# Patient Record
Sex: FEMALE | Race: WHITE
[De-identification: names, ages, dates, MRNs, and addresses within clinical notes are randomized per-mention and may not be internally consistent; named-entity substitution may affect disease eponyms.]

---

## 2018-11-03 ENCOUNTER — HOSPITAL ENCOUNTER (INPATIENT)
Dept: HOSPITAL 61 - ER | Age: 43
LOS: 6 days | Discharge: HOME | DRG: 378 | End: 2018-11-09
Attending: INTERNAL MEDICINE | Admitting: INTERNAL MEDICINE
Payer: COMMERCIAL

## 2018-11-03 VITALS — SYSTOLIC BLOOD PRESSURE: 159 MMHG | DIASTOLIC BLOOD PRESSURE: 88 MMHG

## 2018-11-03 VITALS — BODY MASS INDEX: 36.1 KG/M2 | HEIGHT: 67 IN | WEIGHT: 230 LBS

## 2018-11-03 DIAGNOSIS — E11.65: ICD-10-CM

## 2018-11-03 DIAGNOSIS — T45.515A: ICD-10-CM

## 2018-11-03 DIAGNOSIS — Z83.3: ICD-10-CM

## 2018-11-03 DIAGNOSIS — D68.2: ICD-10-CM
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
## 2018-11-03 DIAGNOSIS — K92.2: Primary | ICD-10-CM

## 2018-11-03 DIAGNOSIS — K59.00: ICD-10-CM

## 2018-11-03 DIAGNOSIS — K50.90: ICD-10-CM

## 2018-11-03 DIAGNOSIS — Z98.51: ICD-10-CM

## 2018-11-03 DIAGNOSIS — D68.51: ICD-10-CM

## 2018-11-03 DIAGNOSIS — G43.909: ICD-10-CM

## 2018-11-03 DIAGNOSIS — Z88.0: ICD-10-CM

## 2018-11-03 DIAGNOSIS — Z86.711: ICD-10-CM

## 2018-11-03 DIAGNOSIS — Z86.718: ICD-10-CM

## 2018-11-03 DIAGNOSIS — Z88.8: ICD-10-CM

## 2018-11-03 DIAGNOSIS — Z79.01: ICD-10-CM
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
## 2018-11-03 DIAGNOSIS — K55.9: ICD-10-CM

## 2018-11-03 DIAGNOSIS — K76.0: ICD-10-CM

## 2018-11-03 DIAGNOSIS — C18.9: ICD-10-CM

## 2018-11-03 DIAGNOSIS — A09: ICD-10-CM

## 2018-11-03 DIAGNOSIS — I10: ICD-10-CM
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
## 2018-11-03 DIAGNOSIS — Z90.49: ICD-10-CM

## 2018-11-03 LAB
ALBUMIN SERPL-MCNC: 3.3 G/DL (ref 3.4–5)
ALBUMIN/GLOB SERPL: 0.8 {RATIO} (ref 1–1.7)
ALP SERPL-CCNC: 81 U/L (ref 46–116)
ALT SERPL-CCNC: 63 U/L (ref 14–59)
ANION GAP SERPL CALC-SCNC: 11 MMOL/L (ref 6–14)
APTT PPP: YELLOW S
AST SERPL-CCNC: 28 U/L (ref 15–37)
BACTERIA #/AREA URNS HPF: (no result) /HPF
BASOPHILS # BLD AUTO: 0.1 X10^3/UL (ref 0–0.2)
BASOPHILS NFR BLD: 1 % (ref 0–3)
BILIRUB SERPL-MCNC: 0.2 MG/DL (ref 0.2–1)
BILIRUB UR QL STRIP: NEGATIVE
BUN SERPL-MCNC: 8 MG/DL (ref 7–20)
BUN/CREAT SERPL: 10 (ref 6–20)
CALCIUM SERPL-MCNC: 9.4 MG/DL (ref 8.5–10.1)
CHLORIDE SERPL-SCNC: 99 MMOL/L (ref 98–107)
CO2 SERPL-SCNC: 25 MMOL/L (ref 21–32)
CREAT SERPL-MCNC: 0.8 MG/DL (ref 0.6–1)
EOSINOPHIL NFR BLD: 0.1 X10^3/UL (ref 0–0.7)
EOSINOPHIL NFR BLD: 1 % (ref 0–3)
ERYTHROCYTE [DISTWIDTH] IN BLOOD BY AUTOMATED COUNT: 13.3 % (ref 11.5–14.5)
FECAL OB PT: POSITIVE
FIBRINOGEN PPP-MCNC: CLEAR MG/DL
GFR SERPLBLD BASED ON 1.73 SQ M-ARVRAT: 78.7 ML/MIN
GLOBULIN SER-MCNC: 4.4 G/DL (ref 2.2–3.8)
GLUCOSE SERPL-MCNC: 426 MG/DL (ref 70–99)
HCT VFR BLD CALC: 40.2 % (ref 36–47)
HGB BLD-MCNC: 14.5 G/DL (ref 12–15.5)
LIPASE: 183 U/L (ref 73–393)
LYMPHOCYTES # BLD: 1.7 X10^3/UL (ref 1–4.8)
LYMPHOCYTES NFR BLD AUTO: 31 % (ref 24–48)
MCH RBC QN AUTO: 30 PG (ref 25–35)
MCHC RBC AUTO-ENTMCNC: 36 G/DL (ref 31–37)
MCV RBC AUTO: 84 FL (ref 79–100)
MONO #: 0.4 X10^3/UL (ref 0–1.1)
MONOCYTES NFR BLD: 7 % (ref 0–9)
NEUT #: 3.3 X10^3UL (ref 1.8–7.7)
NEUTROPHILS NFR BLD AUTO: 60 % (ref 31–73)
NITRITE UR QL STRIP: NEGATIVE
PH UR STRIP: 5 [PH]
PLATELET # BLD AUTO: 219 X10^3/UL (ref 140–400)
POTASSIUM SERPL-SCNC: 4 MMOL/L (ref 3.5–5.1)
PROT SERPL-MCNC: 7.7 G/DL (ref 6.4–8.2)
PROT UR STRIP-MCNC: NEGATIVE MG/DL
PROTHROMBIN TIME: 48.9 SEC (ref 11.7–14)
RBC # BLD AUTO: 4.78 X10^6/UL (ref 3.5–5.4)
RBC #/AREA URNS HPF: 0 /HPF (ref 0–2)
SODIUM SERPL-SCNC: 135 MMOL/L (ref 136–145)
SQUAMOUS #/AREA URNS LPF: (no result) /LPF
UROBILINOGEN UR-MCNC: 0.2 MG/DL
WBC # BLD AUTO: 5.5 X10^3/UL (ref 4–11)
WBC #/AREA URNS HPF: 0 /HPF (ref 0–4)

## 2018-11-03 PROCEDURE — 84443 ASSAY THYROID STIM HORMONE: CPT

## 2018-11-03 PROCEDURE — 96375 TX/PRO/DX INJ NEW DRUG ADDON: CPT

## 2018-11-03 PROCEDURE — 86900 BLOOD TYPING SEROLOGIC ABO: CPT

## 2018-11-03 PROCEDURE — 90471 IMMUNIZATION ADMIN: CPT

## 2018-11-03 PROCEDURE — 86927 PLASMA FRESH FROZEN: CPT

## 2018-11-03 PROCEDURE — 96365 THER/PROPH/DIAG IV INF INIT: CPT

## 2018-11-03 PROCEDURE — 85025 COMPLETE CBC W/AUTO DIFF WBC: CPT

## 2018-11-03 PROCEDURE — 81025 URINE PREGNANCY TEST: CPT

## 2018-11-03 PROCEDURE — P9017 PLASMA 1 DONOR FRZ W/IN 8 HR: HCPCS

## 2018-11-03 PROCEDURE — 81001 URINALYSIS AUTO W/SCOPE: CPT

## 2018-11-03 PROCEDURE — 82274 ASSAY TEST FOR BLOOD FECAL: CPT

## 2018-11-03 PROCEDURE — 85610 PROTHROMBIN TIME: CPT

## 2018-11-03 PROCEDURE — 86901 BLOOD TYPING SEROLOGIC RH(D): CPT

## 2018-11-03 PROCEDURE — 70450 CT HEAD/BRAIN W/O DYE: CPT

## 2018-11-03 PROCEDURE — 74177 CT ABD & PELVIS W/CONTRAST: CPT

## 2018-11-03 PROCEDURE — 83690 ASSAY OF LIPASE: CPT

## 2018-11-03 PROCEDURE — 90756 CCIIV4 VACC ABX FREE IM: CPT

## 2018-11-03 PROCEDURE — 80048 BASIC METABOLIC PNL TOTAL CA: CPT

## 2018-11-03 PROCEDURE — 36415 COLL VENOUS BLD VENIPUNCTURE: CPT

## 2018-11-03 PROCEDURE — 30233L1 TRANSFUSION OF NONAUTOLOGOUS FRESH PLASMA INTO PERIPHERAL VEIN, PERCUTANEOUS APPROACH: ICD-10-PCS | Performed by: INTERNAL MEDICINE

## 2018-11-03 PROCEDURE — 80053 COMPREHEN METABOLIC PANEL: CPT

## 2018-11-03 PROCEDURE — 86850 RBC ANTIBODY SCREEN: CPT

## 2018-11-03 PROCEDURE — 30233K1 TRANSFUSION OF NONAUTOLOGOUS FROZEN PLASMA INTO PERIPHERAL VEIN, PERCUTANEOUS APPROACH: ICD-10-PCS | Performed by: INTERNAL MEDICINE

## 2018-11-03 PROCEDURE — 82962 GLUCOSE BLOOD TEST: CPT

## 2018-11-03 RX ADMIN — MORPHINE SULFATE PRN MG: 4 INJECTION, SOLUTION INTRAMUSCULAR; INTRAVENOUS at 23:04

## 2018-11-03 NOTE — RAD
CT HEAD WO CONTRAST

 

Clinical indications: PT FELL DOWN STAIRS; EVAL FOR BLEED 

 

COMPARISON: None available.

 

Technique: Noncontrast axial cross sectional scanning of the head was 

performed. 

 

PQRS compliance Statement

 

One or more of the following individualized dose reduction techniques were

utilized for this study:

1.  Automated exposure control

2.  Adjustment of the mA and/or kV according to patient size

3.  Use of iterative reconstruction technique

 

 

 

Findings: No acute intracranial hemorrhage or midline shift or mass-effect

or hydrocephalus or extra-axial fluid collection is seen. No focal 

hypodense area or sulci effacement is seen to indicate an acute infarct or

edema radiographically.  No skull fracture or pneumocephalus is seen. No 

opacification of the mastoid sinuses or the paranasal sinuses is seen. The

maxillary sinuses are not completely seen in this study.

 

Impression:  No acute intracranial abnormality is seen.

 

Electronically signed by: German Akhtar MD (11/3/2018 9:09 PM) [facility]

## 2018-11-03 NOTE — RAD
CT study of the abdomen and pelvis with contrast

 

Clinical indications: Patient fell down stairs. Abdominal pain. Bloody 

stools. On Coumadin.

 

TECHNIQUE: After IV infusion of 75 cc of Omnipaque 300, helical CT 

scanning of abdomen and pelvis was performed. No GI contrast was 

administered. This may decrease the sensitivity to detect GI tract 

pathology.

 

 

 

PQRS compliance Statement

 

One or more of the following individualized dose reduction techniques were

utilized for this study:

1.  Automated exposure control

2.  Adjustment of the mA and/or kV according to patient size

3.  Use of iterative reconstruction technique

 

COMPARISON: None available.

 

FINDINGS: The liver and spleen and pancreas are normal. The gallbladder is

surgically absent. No extra hepatic biliary ductal dilatation is seen. No 

adrenal mass is evident. Both kidneys are normal without hydronephrosis or

hydroureter. Urinary bladder wall is smooth. No uterine mass is seen. No 

dominant ovarian cyst or mass is evident. No focal aneurysmal dilatation 

of the abdominal aorta is seen. No enlarged abdominal or pelvic 

lymphadenopathy is evident. No obstructive bowel pattern is evident. The 

terminal ileum is unremarkable. The appendix appears normal. There is 

segmental wall thickening of the descending colon and sigmoid colon. This 

may be due to incomplete distention but could be seen with colitis. No 

free intraperitoneal air or free fluid or mesenteric edema is seen. No 

lung base consolidation is evident. No osteolytic process is evident. No 

anterior abdominal wall musculature hematoma is evident. No 

retroperitoneal hematoma is evident.

 

IMPRESSION: Segmental wall thickening of the descending colon and sigmoid 

colon. This may be due to incomplete distention but could be seen with 

colitis. No pneumatosis intestinalis or free air or free fluid or 

mesenteric edema is seen.

 

The heart size mildly enlarged.

 

Electronically signed by: German Akhtar MD (11/3/2018 9:49 PM) [facility]

## 2018-11-03 NOTE — PHYS DOC
Past Medical History


Past Medical History:  Diabetes-Type II, DVT, Hypertension


Additional Past Medical Histor:  PE/DVT, HEART MURMUR,


Past Surgical History:  Cholecystectomy, Tubal ligation


Additional Past Surgical Histo:  OVARIAN CYST,BILAT EARS,LEAP, LYMP NODES


Alcohol Use:  None


Drug Use:  None





Adult General


Chief Complaint


Chief Complaint:  ABDOMINAL PAIN





HPI


HPI





Patient is a 43  year old female history of factor V Leiden deficiency present 

with abdominal pain. Onset 2 days ago she fell down 10 steps she went to 

[facility] her INR was 10.5 she is on Coumadin they give her 

vitamin K came down to 7.5 she says she had a CT head and CT C-spine that was 

negative acute and they observed her overnight and then sent her home but her 

abdominal pain is described as cramping diffuse bilateral lower quadrant 

radiates to the back also some pain with moving her left hip she said they did 

not do a CT scan on that area despite she did tell them she was having 

abdominal pain. The pain is getting worse with time so she came to the ER for 

evaluation.





Review of Systems


Review of Systems





Constitutional: Denies fever or chills []


Eyes: Denies change in visual acuity, redness, or eye pain []


HENT: Denies nasal congestion or sore throat []


Respiratory: Denies cough or shortness of breath []


Cardiovascular: No additional information not addressed in HPI []





: Denies dysuria or hematuria []


Musculoskeletal


Integument: Denies rash or skin lesions []





All other systems were reviewed and found to be within normal limits, except as 

documented in this note.





Current Medications


Current Medications





Current Medications








 Medications


  (Trade)  Dose


 Ordered  Sig/Lila  Start Time


 Stop Time Status Last Admin


Dose Admin


 


 Fentanyl Citrate


  (Fentanyl 2ml


 Vial)  50 mcg  1X  ONCE  11/3/18 20:15


 11/3/18 20:16 DC 11/3/18 20:24


50 MCG


 


 Iohexol


  (Omnipaque 300


 Mg/ml)  100 ml  STK-MED ONCE  11/3/18 21:08


 11/3/18 21:09 DC  





 


 Metronidazole  100 ml @ 


 100 mls/hr  1X  ONCE  11/3/18 22:30


 11/3/18 23:29  11/3/18 23:04


100 MLS/HR


 


 Morphine Sulfate


  (Morphine


 Sulfate)  4 mg  PRN Q2HR  PRN  11/3/18 22:45


 11/4/18 22:44  11/3/18 23:04


4 MG


 


 Phytonadione


  (Mephyton Oral


 Soln)  2.5 mg  1X  ONCE  11/3/18 22:45


 11/3/18 22:46 DC 11/3/18 23:05


2.5 MG


 


 Sodium Chloride  1,000 ml @ 


 100 mls/hr  Q10H  11/3/18 22:45


 11/4/18 22:44   














Allergies


Allergies





Allergies








Coded Allergies Type Severity Reaction Last Updated Verified


 


  acetaminophen Allergy Severe THROAT SWELLS 3/2/16 Yes


 


  propoxyphene Allergy Severe THROAT SWELLS 3/2/16 Yes


 


  Penicillins Allergy Intermediate HIVES 3/2/16 Yes











Physical Exam


Physical Exam





Constitutional: Well developed, well nourished, no acute distress, non-toxic 

appearance. []


HENT: Normocephalic, atraumatic, bilateral external ears normal, oropharynx 

moist, no oral exudates, nose normal. []


Eyes: PERRLA, EOMI, conjunctiva normal, no discharge. [] 


Neck: Normal range of motion, left paraspinous tenderness, supple, no stridor. [

] 


Cardiovascular:Heart rate regular rhythm, no murmur []


Lungs & Thorax:  Bilateral breath sounds clear to auscultation []


Abdomen: Bowel sounds normal, soft,rlq and llq  tenderness, no masses, no 

pulsatile masses. [] 


rectal performed by rn staff showed brown stool.


Skin: Warm, dry, no erythema, no rash. [] 


Back: pos b/l paraspinous ttp noted, no trauma seen. obese noted.


Extremities: No tenderness, no cyanosis, no clubbing, ROM intact, no edema. []  

mild pain with rom of the left hip but still intact.


Neurologic: Alert and oriented X 3, normal motor function, normal sensory 

function, no focal deficits noted. []


Psychologic: Affect normal, judgement normal, mood normal. []





Current Patient Data


Vital Signs





 Vital Signs








  Date Time  Temp Pulse Resp B/P (MAP) Pulse Ox O2 Delivery O2 Flow Rate FiO2


 


11/3/18 20:07 98.2 92 20 167/114 (131) 97 Room Air  





 98.2       








Lab Values





 Laboratory Tests








Test


 11/3/18


19:57 11/3/18


20:08 11/3/18


20:18 11/3/18


21:08


 


Urine Collection Type Unknown     


 


Urine Color Yellow     


 


Urine Clarity Clear     


 


Urine pH 5.0     


 


Urine Specific Gravity >=1.030     


 


Urine Protein


 Negative mg/dL


(NEG-TRACE) 


 


 





 


Urine Glucose (UA)


 >=1000 mg/dL


(NEG) 


 


 





 


Urine Ketones (Stick)


 Negative mg/dL


(NEG) 


 


 





 


Urine Blood


 Negative (NEG)


 


 


 





 


Urine Nitrite


 Negative (NEG)


 


 


 





 


Urine Bilirubin


 Negative (NEG)


 


 


 





 


Urine Urobilinogen Dipstick


 0.2 mg/dL (0.2


mg/dL) 


 


 





 


Urine Leukocyte Esterase


 Negative (NEG)


 


 


 





 


Urine RBC 0 /HPF (0-2)     


 


Urine WBC 0 /HPF (0-4)     


 


Urine Squamous Epithelial


Cells Mod /LPF  


 


 


 





 


Urine Bacteria


 Few /HPF


(0-FEW) 


 


 





 


POC Urine HCG, Qualitative


 


 Hcg negative


(Negative) 


 





 


White Blood Count


 


 


 5.5 x10^3/uL


(4.0-11.0) 





 


Red Blood Count


 


 


 4.78 x10^6/uL


(3.50-5.40) 





 


Hemoglobin


 


 


 14.5 g/dL


(12.0-15.5) 





 


Hematocrit


 


 


 40.2 %


(36.0-47.0) 





 


Mean Corpuscular Volume


 


 


 84 fL ()


 





 


Mean Corpuscular Hemoglobin   30 pg (25-35)   


 


Mean Corpuscular Hemoglobin


Concent 


 


 36 g/dL


(31-37) 





 


Red Cell Distribution Width


 


 


 13.3 %


(11.5-14.5) 





 


Platelet Count


 


 


 219 x10^3/uL


(140-400) 





 


Neutrophils (%) (Auto)   60 % (31-73)   


 


Lymphocytes (%) (Auto)   31 % (24-48)   


 


Monocytes (%) (Auto)   7 % (0-9)   


 


Eosinophils (%) (Auto)   1 % (0-3)   


 


Basophils (%) (Auto)   1 % (0-3)   


 


Neutrophils # (Auto)


 


 


 3.3 x10^3uL


(1.8-7.7) 





 


Lymphocytes # (Auto)


 


 


 1.7 x10^3/uL


(1.0-4.8) 





 


Monocytes # (Auto)


 


 


 0.4 x10^3/uL


(0.0-1.1) 





 


Eosinophils # (Auto)


 


 


 0.1 x10^3/uL


(0.0-0.7) 





 


Basophils # (Auto)


 


 


 0.1 x10^3/uL


(0.0-0.2) 





 


Prothrombin Time


 


 


 48.9 SEC


(11.7-14.0)  H 





 


Prothrombin Time INR


 


 


 5.5 (0.8-1.1)


*H 





 


Sodium Level


 


 


 135 mmol/L


(136-145)  L 





 


Potassium Level


 


 


 4.0 mmol/L


(3.5-5.1) 





 


Chloride Level


 


 


 99 mmol/L


() 





 


Carbon Dioxide Level


 


 


 25 mmol/L


(21-32) 





 


Anion Gap   11 (6-14)   


 


Blood Urea Nitrogen


 


 


 8 mg/dL (7-20)


 





 


Creatinine


 


 


 0.8 mg/dL


(0.6-1.0) 





 


Estimated GFR


(Cockcroft-Gault) 


 


 78.7  


 





 


BUN/Creatinine Ratio   10 (6-20)   


 


Glucose Level


 


 


 426 mg/dL


(70-99)  H 





 


Calcium Level


 


 


 9.4 mg/dL


(8.5-10.1) 





 


Total Bilirubin


 


 


 0.2 mg/dL


(0.2-1.0) 





 


Aspartate Amino Transferase


(AST) 


 


 28 U/L (15-37)


 





 


Alanine Aminotransferase (ALT)


 


 


 63 U/L (14-59)


H 





 


Alkaline Phosphatase


 


 


 81 U/L


() 





 


Total Protein


 


 


 7.7 g/dL


(6.4-8.2) 





 


Albumin


 


 


 3.3 g/dL


(3.4-5.0)  L 





 


Albumin/Globulin Ratio


 


 


 0.8 (1.0-1.7)


L 





 


Lipase


 


 


 183 U/L


() 





 


Stool Occult Blood


 


 


 


 Positive (NEG)








 Laboratory Tests


11/3/18 20:18








 Laboratory Tests


11/3/18 20:18














EKG


EKG


[]





Radiology/Procedures


Radiology/Procedures


[]


Impressions:


FINDINGS: The liver and spleen and pancreas are normal. The gallbladder is


surgically absent. No extra hepatic biliary ductal dilatation is seen. No 


adrenal mass is evident. Both kidneys are normal without hydronephrosis or


hydroureter. Urinary bladder wall is smooth. No uterine mass is seen. No 


dominant ovarian cyst or mass is evident. No focal aneurysmal dilatation 


of the abdominal aorta is seen. No enlarged abdominal or pelvic 


lymphadenopathy is evident. No obstructive bowel pattern is evident. The 


terminal ileum is unremarkable. The appendix appears normal. There is 


segmental wall thickening of the descending colon and sigmoid colon. This 


may be due to incomplete distention but could be seen with colitis. No 


free intraperitoneal air or free fluid or mesenteric edema is seen. No 


lung base consolidation is evident. No osteolytic process is evident. No 


anterior abdominal wall musculature hematoma is evident. No 


retroperitoneal hematoma is evident.


 


IMPRESSION: Segmental wall thickening of the descending colon and sigmoid 


colon. This may be due to incomplete distention but could be seen with 


colitis. No pneumatosis intestinalis or free air or free fluid or 


mesenteric edema is seen.


 


The heart size mildly enlarged.


 


Electronically signed by: German Akhtar MD (11/3/2018 9:49 PM) [facility]











3.  Use of iterative reconstruction technique


 


 


 


Findings: No acute intracranial hemorrhage or midline shift or mass-effect


or hydrocephalus or extra-axial fluid collection is seen. No focal 


hypodense area or sulci effacement is seen to indicate an acute infarct or


edema radiographically.  No skull fracture or pneumocephalus is seen. No 


opacification of the mastoid sinuses or the paranasal sinuses is seen. The


maxillary sinuses are not completely seen in this study.


 


Impression:  No acute intracranial abnormality is seen.


 


Electronically signed by: German Akhtar MD (11/3/2018 9:09 PM) [facility] [place]

















Course & Med Decision Making


Course & Med Decision Making


Pertinent Labs and Imaging studies reviewed. (See chart for details)





[]43-year-old female with history of factor V Leiden who is minor 5.5 

apparently was 10.52 days ago she did fall down the stairs. She has been having 

abdominal pain since then but she also is having some bright red blood per 

rectum mixed in with brown stool she tells me. CT scan shows colitis most 

likely. Patient is hemodynamically stable hemoglobin is normal patient received 

antibiotics as well as a small dose of vitamin K in the emergency room because 

her INR is still 5.5 with her rectal bleeding even though mild I don't want to 

get worse. I think the benefit of a small amount of vitamin K especially in 

light of starting antibiotics which may affect her INR is beneficial.





We'll admit to the service of Dr. turner discussed case with him at 10:30 PM





Dragon Disclaimer


Tatianna Disclaimer


This electronic medical record was generated, in whole or in part, using a 

voice recognition dictation system.





Departure


Departure


Impression:  


 Primary Impression:  


 Colitis


 Additional Impression:  


 Coumadin toxicity


Disposition:  09 ADMITTED AS INPATIENT


Admitting Physician:  Other


Condition:  STABLE


Referrals:  


UNKNOWN PCP NAME (PCP)





Problem Qualifiers











KATIE GAMEZ MD Nov 3, 2018 20:23

## 2018-11-03 NOTE — PDOC1
History and Physical


Date of Admission


Date of Admission


DATE: 11/3/18 


TIME: 23:38





Identification/Chief Complaint


Chief Complaint


Abdominal pain


Rectal bleeding





Source


Source:  Patient





History of Present Illness


History of Present Illness


44 yo F w/ PMHx DM, migraines, factor V Leiden deficiency (multiple PE, DVT on 

lifelong anticoagulation with coumadin) p/w abdominal pain. Onset 2 days ago, 

associated with blood in stool


Pain is cramping, colicky diffuse bilateral upper quadrant radiates to the back 

also some pain with moving her left hip. 





Incidentally she fell down steps 2 days ago, went to [facility] 

her INR was 10.5 --> 7.5 with vitamin K, she says she had a CT head and CT C-

spine that was negative acute and they observed her overnight. No CT scan 

abdomen despite the lower GI bleeding and pain being her secondary complaint.





In ED CT showed possible inflammation, no leukocytosis, but ALT 63. Glucose was 

426, lipase 183. Her INR was 5.5 and had positive occult blood in stool.





Past Medical History


Cardiovascular:  No pertinent hx


Pulmonary:  No pertinent hx


CENTRAL NERVOUS SYSTEM:  Migraine


GI:  No pertinent hx


Heme/Onc:  Other (FV Leiden deficiency)


Hepatobiliary:  No pertinent hx


Psych:  No pertinent hx


Rheumatologic:  No pertinent hx


Infectious disease:  No pertinent hx


ENT:  No pertinent hx


Renal/:  No pertinent hx


Endocrine:  Diabetes


Dermatology:  No pertinent hx





Past Surgical History


Past Surgical History:  Cholecystectomy, Tubal Ligation





Family History


Family History:  Diabetes, High Cholestrol, Migranes


Family History:  Grandparents (FV leiden)





Social History


Smoke:  No


ALCOHOL:  rare


Drugs:  None





Current Problem List


Problem List


Problems


Medical Problems:


(1) Coumadin toxicity


Status: Acute  











Current Medications


Current Medications





Current Medications


Fentanyl Citrate (Fentanyl 2ml Vial) 50 mcg 1X  ONCE IV  Last administered on 11

/3/18at 20:24;  Start 11/3/18 at 20:15;  Stop 11/3/18 at 20:16;  Status DC


Iohexol (Omnipaque 300 Mg/ml) 100 ml STK-MED ONCE .ROUTE ;  Start 11/3/18 at 21:

08;  Stop 11/3/18 at 21:09;  Status DC


Metronidazole 100 ml @  100 mls/hr 1X  ONCE IV  Last administered on 11/3/18at 

23:04;  Start 11/3/18 at 22:30;  Stop 11/3/18 at 23:29;  Status DC


Aztreonam 1 gm/ Dextrose 50 ml @  100 mls/hr Q6HRS IV ;  Start 11/4/18 at 06:00


Sodium Chloride 1,000 ml @  1,000 mls/hr 1X  ONCE IV  Last administered on 11/3/

18at 23:04;  Start 11/3/18 at 22:30;  Stop 11/3/18 at 23:29;  Status DC


Phytonadione (Mephyton Oral Soln) 2.5 mg 1X  ONCE PO  Last administered on 11/3/

18at 23:05;  Start 11/3/18 at 22:45;  Stop 11/3/18 at 22:46;  Status DC


Morphine Sulfate (Morphine Sulfate) 4 mg PRN Q2HR  PRN IV PAIN Last 

administered on 11/3/18at 23:04;  Start 11/3/18 at 22:45;  Stop 11/4/18 at 22:44


Sodium Chloride 1,000 ml @  100 mls/hr Q10H IV ;  Start 11/3/18 at 22:45;  Stop 

11/4/18 at 22:44


Aztreonam 1 gm/ Dextrose 50 ml @  100 mls/hr ONCE  ONCE IV ;  Start 11/3/18 at 

23:00;  Stop 11/3/18 at 23:29;  Status DC





Active Scripts


Active


Reported


Atacand (Candesartan Cilexetil) 32 Mg Tablet 1 Tab PO DAILY


Quinapril-Hctz 10-12.5 Mg Tab (Quinapril/Hydrochlorothiazide) 1 Each Tablet 1 

Each PO DAILY


Relpax (Eletriptan Hbr) 20 Mg Tablet 20 Mg PO 


Reglan (Metoclopramide Hcl) 10 Mg Tablet 10 Mg PO [id number]


Lyrica (Pregabalin) 50 Mg Capsule 50 Mg PO BID


Clonazepam 0.5 Mg Tablet 1 Tab PO DAILY


Zoloft (Sertraline Hcl) 50 Mg Tablet 1 Tab PO DAILY


Crestor (Rosuvastatin Calcium) 10 Mg Tablet 1 Tab PO DAILY


Lantus Solostar (Insulin Glargine,Hum.rec.anlog) 100 Unit/1 Ml Insuln.pen 10 

Unit SQ QHS





Allergies


Allergies:  


Coded Allergies:  


     acetaminophen (Verified  Allergy, Severe, THROAT SWELLS, 3/2/16)


     propoxyphene (Verified  Allergy, Severe, THROAT SWELLS, 3/2/16)


     Penicillins (Verified  Allergy, Intermediate, HIVES, 3/2/16)


     vancomycin (Verified  Allergy, Unknown, 11/4/18)





ROS


General:  No: Chills, Night Sweats, Fatigue, Malaise, Appetite, Other


PSYCHOLOGICAL ROS:  No: Anxiety, Behavioral Disorder, Concentration difficultie

, Decreased libido, Depression, Disorientation, Hallucinations, Hostility, 

Irritablity, Memory difficulties, Mood Swings, Obsessive thoughts, Physical 

abuse, Sexual abuse, Sleep disturbances, Suicidal ideation, Other


Eyes:  No Blurry vision, No Decreased vision, No Double vision, No Dry eyes, No 

Excessive tearing, No Eye Pain, No Itchy Eyes, No Loss of vision, No Photophobia

, No Scotomata, No Uses contacts, No Uses glasses, No Other


HEENT:  YES: Heacaches; 


   No: Visual Changes, Hearing change, Nasal congestion, Nasal discharge, Oral 

lesions, Sinus pain, Sore Throat, Epistaxis, Sneezing, Snoring, Tinnitus, 

Vertigo, Vocal changes, Other


ALLERGY AND IMMUNOLOGY:  No: Hives, Insect Bite Sensitivity, Itchy/Watery Eyes, 

Nasal Congestion, Post Nasal Drip, Seasonal Allergies, Other


Hematological and Lymphatic:  YES: Bleeding Problems, Blood Clots; 


   No: Blood Transfusions, Brusing, Night Sweats, Pallor, Swollen Lymph Nodes, 

Other


ENDOCRINE:  No: Breast Changes, Galactorrhea, Hair Pattern Changes, Hot Flashes

, Malaise/lethargy, Mood Swings, Palpitations, Polydipsia/polyuria, Skin Changes

, Temperature Intolerance, Unexpected Weight Changes, Other


Breast:  No New/Changing Breast Lumps, No Nipple changes, No Nipple discharge, 

No Other


Respiratory:  No: Cough, Hemoptysis, Orthopnea, Pleuritic Pain, Shortness of 

breath, SOB with excertion, Sputum Changes, Stridor, Tachypnea, Wheezing, Other


Cardiovascular:  No Chest Pain, No Palpitations, No Orthopnea, No Paroxysmal 

Noc. Dyspnea, No Edema, No Lt Headedness, No Other


Gastrointestinal:  Yes Nausea, Yes Abdominal Pain, Yes Melena, Yes Hematochezia


Genitourinary:  No Dysuria, No Frequency, No Incontinence, No Hematuria, No 

Retention, No Discharge, No Urgency, No Pain, No Flank Pain, No Other, No , No 

, No , No , No , No , No 


Musculoskeletal:  No Gait Disturbance, No Joint Pain, No Joint Stiffness, No 

Joint Swelling, No Muscle Pain, No Muscular Weakness, No Pain In:, No Swelling 

In:, No Other


Neurological:  No Behavorial Changes, No Bowel/Bladder ControlChng, No Confusion

, No Dizziness, No Gait Disturbance, No Headaches, No Impaired Coord/balance, 

No Memory Loss, No Numbness/Tingling, No Seizures, No Speech Problems, No 

Tremors, No Visual Changes, No Weakness, No Other


Skin:  No Dry Skin, No Eczema, No Hair Changes, No Lumps, No Mole Changes, No 

Mottling, No Nail Changes, No Pruritus, No Rash, No Skin Lesion Changes, No 

Other, No Acne





Physical Exam


General:  Alert, Oriented X3, Cooperative, No acute distress


HEENT:  Atraumatic, PERRLA, EOMI, Mucous membr. moist/pink


Lungs:  Clear to auscultation, Normal air movement


Heart:  S1S2, RRR, no murmurs


Breasts:  Not examined


Abdomen:  Normal bowel sounds, Soft, Other (Tenderness in bilateral upper 

quadrants, epigastrium and LLQ)


Extremities:  No clubbing, No cyanosis, No edema, Normal pulses, No tenderness/

swelling


Skin:  No rashes, No breakdown, No significant lesion


Neuro:  Normal gait, Normal speech, Strength at 5/5 X4 ext, Normal tone, 

Sensation intact (Decreased pinprick consistent with diabetic neuropathy, early)

, Cranial nerves 3-12 NL, Reflexes 2+


Psych/Mental Status:  Mental status NL, Mood NL





Vitals


Vitals





Vital Signs








  Date Time  Temp Pulse Resp B/P (MAP) Pulse Ox O2 Delivery O2 Flow Rate FiO2


 


11/3/18 20:07 98.2 92 20 167/114 (131) 97 Room Air  





 98.2       











Labs


Labs





Laboratory Tests








Test


 11/3/18


19:57 11/3/18


20:08 11/3/18


20:18 11/3/18


21:08


 


Urine Collection Type Unknown    


 


Urine Color Yellow    


 


Urine Clarity Clear    


 


Urine pH 5.0    


 


Urine Specific Gravity >=1.030    


 


Urine Protein


 Negative mg/dL


(NEG-TRACE) 


 


 





 


Urine Glucose (UA)


 >=1000 mg/dL


(NEG) 


 


 





 


Urine Ketones (Stick)


 Negative mg/dL


(NEG) 


 


 





 


Urine Blood Negative (NEG)    


 


Urine Nitrite Negative (NEG)    


 


Urine Bilirubin Negative (NEG)    


 


Urine Urobilinogen Dipstick


 0.2 mg/dL (0.2


mg/dL) 


 


 





 


Urine Leukocyte Esterase Negative (NEG)    


 


Urine RBC 0 /HPF (0-2)    


 


Urine WBC 0 /HPF (0-4)    


 


Urine Squamous Epithelial


Cells Mod /LPF 


 


 


 





 


Urine Bacteria


 Few /HPF


(0-FEW) 


 


 





 


Bedside Urine HCG, Qualitative


 


 Hcg negative


(Negative) 


 





 


White Blood Count


 


 


 5.5 x10^3/uL


(4.0-11.0) 





 


Red Blood Count


 


 


 4.78 x10^6/uL


(3.50-5.40) 





 


Hemoglobin


 


 


 14.5 g/dL


(12.0-15.5) 





 


Hematocrit


 


 


 40.2 %


(36.0-47.0) 





 


Mean Corpuscular Volume   84 fL ()  


 


Mean Corpuscular Hemoglobin   30 pg (25-35)  


 


Mean Corpuscular Hemoglobin


Concent 


 


 36 g/dL


(31-37) 





 


Red Cell Distribution Width


 


 


 13.3 %


(11.5-14.5) 





 


Platelet Count


 


 


 219 x10^3/uL


(140-400) 





 


Neutrophils (%) (Auto)   60 % (31-73)  


 


Lymphocytes (%) (Auto)   31 % (24-48)  


 


Monocytes (%) (Auto)   7 % (0-9)  


 


Eosinophils (%) (Auto)   1 % (0-3)  


 


Basophils (%) (Auto)   1 % (0-3)  


 


Neutrophils # (Auto)


 


 


 3.3 x10^3uL


(1.8-7.7) 





 


Lymphocytes # (Auto)


 


 


 1.7 x10^3/uL


(1.0-4.8) 





 


Monocytes # (Auto)


 


 


 0.4 x10^3/uL


(0.0-1.1) 





 


Eosinophils # (Auto)


 


 


 0.1 x10^3/uL


(0.0-0.7) 





 


Basophils # (Auto)


 


 


 0.1 x10^3/uL


(0.0-0.2) 





 


Prothrombin Time


 


 


 48.9 SEC


(11.7-14.0) 





 


Prothromb Time International


Ratio 


 


 5.5 (0.8-1.1) 


 





 


Sodium Level


 


 


 135 mmol/L


(136-145) 





 


Potassium Level


 


 


 4.0 mmol/L


(3.5-5.1) 





 


Chloride Level


 


 


 99 mmol/L


() 





 


Carbon Dioxide Level


 


 


 25 mmol/L


(21-32) 





 


Anion Gap   11 (6-14)  


 


Blood Urea Nitrogen   8 mg/dL (7-20)  


 


Creatinine


 


 


 0.8 mg/dL


(0.6-1.0) 





 


Estimated GFR


(Cockcroft-Gault) 


 


 78.7 


 





 


BUN/Creatinine Ratio   10 (6-20)  


 


Glucose Level


 


 


 426 mg/dL


(70-99) 





 


Calcium Level


 


 


 9.4 mg/dL


(8.5-10.1) 





 


Total Bilirubin


 


 


 0.2 mg/dL


(0.2-1.0) 





 


Aspartate Amino Transf


(AST/SGOT) 


 


 28 U/L (15-37) 


 





 


Alanine Aminotransferase


(ALT/SGPT) 


 


 63 U/L (14-59) 


 





 


Alkaline Phosphatase


 


 


 81 U/L


() 





 


Total Protein


 


 


 7.7 g/dL


(6.4-8.2) 





 


Albumin


 


 


 3.3 g/dL


(3.4-5.0) 





 


Albumin/Globulin Ratio   0.8 (1.0-1.7)  


 


Lipase


 


 


 183 U/L


() 





 


Stool Occult Blood    Positive (NEG) 








Laboratory Tests








Test


 11/3/18


19:57 11/3/18


20:08 11/3/18


20:18 11/3/18


21:08


 


Urine Collection Type Unknown    


 


Urine Color Yellow    


 


Urine Clarity Clear    


 


Urine pH 5.0    


 


Urine Specific Gravity >=1.030    


 


Urine Protein


 Negative mg/dL


(NEG-TRACE) 


 


 





 


Urine Glucose (UA)


 >=1000 mg/dL


(NEG) 


 


 





 


Urine Ketones (Stick)


 Negative mg/dL


(NEG) 


 


 





 


Urine Blood Negative (NEG)    


 


Urine Nitrite Negative (NEG)    


 


Urine Bilirubin Negative (NEG)    


 


Urine Urobilinogen Dipstick


 0.2 mg/dL (0.2


mg/dL) 


 


 





 


Urine Leukocyte Esterase Negative (NEG)    


 


Urine RBC 0 /HPF (0-2)    


 


Urine WBC 0 /HPF (0-4)    


 


Urine Squamous Epithelial


Cells Mod /LPF 


 


 


 





 


Urine Bacteria


 Few /HPF


(0-FEW) 


 


 





 


Bedside Urine HCG, Qualitative


 


 Hcg negative


(Negative) 


 





 


White Blood Count


 


 


 5.5 x10^3/uL


(4.0-11.0) 





 


Red Blood Count


 


 


 4.78 x10^6/uL


(3.50-5.40) 





 


Hemoglobin


 


 


 14.5 g/dL


(12.0-15.5) 





 


Hematocrit


 


 


 40.2 %


(36.0-47.0) 





 


Mean Corpuscular Volume   84 fL ()  


 


Mean Corpuscular Hemoglobin   30 pg (25-35)  


 


Mean Corpuscular Hemoglobin


Concent 


 


 36 g/dL


(31-37) 





 


Red Cell Distribution Width


 


 


 13.3 %


(11.5-14.5) 





 


Platelet Count


 


 


 219 x10^3/uL


(140-400) 





 


Neutrophils (%) (Auto)   60 % (31-73)  


 


Lymphocytes (%) (Auto)   31 % (24-48)  


 


Monocytes (%) (Auto)   7 % (0-9)  


 


Eosinophils (%) (Auto)   1 % (0-3)  


 


Basophils (%) (Auto)   1 % (0-3)  


 


Neutrophils # (Auto)


 


 


 3.3 x10^3uL


(1.8-7.7) 





 


Lymphocytes # (Auto)


 


 


 1.7 x10^3/uL


(1.0-4.8) 





 


Monocytes # (Auto)


 


 


 0.4 x10^3/uL


(0.0-1.1) 





 


Eosinophils # (Auto)


 


 


 0.1 x10^3/uL


(0.0-0.7) 





 


Basophils # (Auto)


 


 


 0.1 x10^3/uL


(0.0-0.2) 





 


Prothrombin Time


 


 


 48.9 SEC


(11.7-14.0) 





 


Prothromb Time International


Ratio 


 


 5.5 (0.8-1.1) 


 





 


Sodium Level


 


 


 135 mmol/L


(136-145) 





 


Potassium Level


 


 


 4.0 mmol/L


(3.5-5.1) 





 


Chloride Level


 


 


 99 mmol/L


() 





 


Carbon Dioxide Level


 


 


 25 mmol/L


(21-32) 





 


Anion Gap   11 (6-14)  


 


Blood Urea Nitrogen   8 mg/dL (7-20)  


 


Creatinine


 


 


 0.8 mg/dL


(0.6-1.0) 





 


Estimated GFR


(Cockcroft-Gault) 


 


 78.7 


 





 


BUN/Creatinine Ratio   10 (6-20)  


 


Glucose Level


 


 


 426 mg/dL


(70-99) 





 


Calcium Level


 


 


 9.4 mg/dL


(8.5-10.1) 





 


Total Bilirubin


 


 


 0.2 mg/dL


(0.2-1.0) 





 


Aspartate Amino Transf


(AST/SGOT) 


 


 28 U/L (15-37) 


 





 


Alanine Aminotransferase


(ALT/SGPT) 


 


 63 U/L (14-59) 


 





 


Alkaline Phosphatase


 


 


 81 U/L


() 





 


Total Protein


 


 


 7.7 g/dL


(6.4-8.2) 





 


Albumin


 


 


 3.3 g/dL


(3.4-5.0) 





 


Albumin/Globulin Ratio   0.8 (1.0-1.7)  


 


Lipase


 


 


 183 U/L


() 





 


Stool Occult Blood    Positive (NEG) 











Images


Images


CT abdomen - Segmental wall thickening of the descending colon and sigmoid 


colon. This may be due to incomplete distention but could be seen with 


colitis. No pneumatosis intestinalis or free air or free fluid or 


mesenteric edema is seen.





VTE Prophylaxis Ordered


VTE Prophylaxis Devices:  Yes


VTE Pharmacological Prophylaxi:  Yes





Assessment/Plan


Assessment/Plan


A/P:


Abdominal Pain - likely colitis on CT, will treat as infectious, cipro + 

flagyl. Was given aztreonam as she stated she is allergic to multiple 

antibiotics and records were incomplete. Will consult GI as there is a LGIB as 

well


Acute blood loss - LGIB apparently based on BRBPR for days. Hb stable, VS 

stable. Will consult GI, repeat CBC


Migraines - currently with HA, will use imitrex prn, toradol 15mg, compazine prn


DM - with A1c of 12.6, only had diagnosis for 4 years, on metformin and 

insulin. Will cont and add sliding scale and meal coverage


FV leiden - with h/o multiple PE, will need to continue coumadin therapy with 

goal INR 2-3


Supratherapeutic INR - will monitor daily, as she had GI bleeding was given 

vitamin K in ED


Fall - CT head negative, will treat headache


Transaminitis - likely related to hyperglycemia/fatty liver, but will check TSH

, consider iron studies





FEN - ADA diet


PPX - coumadin


FULL CODE


Inpatient for at least 2 midnights for her GI bleed and colitis











JEFFERSON WRIGHT MD Nov 3, 2018 23:38

## 2018-11-04 VITALS — DIASTOLIC BLOOD PRESSURE: 51 MMHG | SYSTOLIC BLOOD PRESSURE: 87 MMHG

## 2018-11-04 VITALS — DIASTOLIC BLOOD PRESSURE: 41 MMHG | SYSTOLIC BLOOD PRESSURE: 98 MMHG

## 2018-11-04 VITALS — SYSTOLIC BLOOD PRESSURE: 122 MMHG | DIASTOLIC BLOOD PRESSURE: 56 MMHG

## 2018-11-04 VITALS — SYSTOLIC BLOOD PRESSURE: 120 MMHG | DIASTOLIC BLOOD PRESSURE: 63 MMHG

## 2018-11-04 VITALS — DIASTOLIC BLOOD PRESSURE: 64 MMHG | SYSTOLIC BLOOD PRESSURE: 107 MMHG

## 2018-11-04 VITALS — DIASTOLIC BLOOD PRESSURE: 52 MMHG | SYSTOLIC BLOOD PRESSURE: 108 MMHG

## 2018-11-04 LAB
ALBUMIN SERPL-MCNC: 2.9 G/DL (ref 3.4–5)
ALBUMIN/GLOB SERPL: 0.8 {RATIO} (ref 1–1.7)
ALP SERPL-CCNC: 72 U/L (ref 46–116)
ALT SERPL-CCNC: 61 U/L (ref 14–59)
ANION GAP SERPL CALC-SCNC: 8 MMOL/L (ref 6–14)
AST SERPL-CCNC: 35 U/L (ref 15–37)
BILIRUB SERPL-MCNC: 0.3 MG/DL (ref 0.2–1)
BUN SERPL-MCNC: 9 MG/DL (ref 7–20)
BUN/CREAT SERPL: 15 (ref 6–20)
CALCIUM SERPL-MCNC: 8.6 MG/DL (ref 8.5–10.1)
CHLORIDE SERPL-SCNC: 102 MMOL/L (ref 98–107)
CO2 SERPL-SCNC: 26 MMOL/L (ref 21–32)
CREAT SERPL-MCNC: 0.6 MG/DL (ref 0.6–1)
GFR SERPLBLD BASED ON 1.73 SQ M-ARVRAT: 109.6 ML/MIN
GLOBULIN SER-MCNC: 3.6 G/DL (ref 2.2–3.8)
GLUCOSE SERPL-MCNC: 360 MG/DL (ref 70–99)
POTASSIUM SERPL-SCNC: 4 MMOL/L (ref 3.5–5.1)
PROT SERPL-MCNC: 6.5 G/DL (ref 6.4–8.2)
PROTHROMBIN TIME: 54.1 SEC (ref 11.7–14)
SODIUM SERPL-SCNC: 136 MMOL/L (ref 136–145)

## 2018-11-04 RX ADMIN — ATORVASTATIN CALCIUM SCH MG: 40 TABLET, FILM COATED ORAL at 20:41

## 2018-11-04 RX ADMIN — CIPROFLOXACIN SCH MLS/HR: 2 INJECTION INTRAVENOUS at 01:30

## 2018-11-04 RX ADMIN — INSULIN GLARGINE SCH UNITS: 100 INJECTION, SOLUTION SUBCUTANEOUS at 01:15

## 2018-11-04 RX ADMIN — KETOROLAC TROMETHAMINE PRN MG: 15 INJECTION, SOLUTION INTRAMUSCULAR; INTRAVENOUS at 10:13

## 2018-11-04 RX ADMIN — CIPROFLOXACIN SCH MLS/HR: 2 INJECTION INTRAVENOUS at 20:49

## 2018-11-04 RX ADMIN — BACITRACIN SCH MLS/HR: 5000 INJECTION, POWDER, FOR SOLUTION INTRAMUSCULAR at 09:12

## 2018-11-04 RX ADMIN — SERTRALINE HYDROCHLORIDE SCH MG: 50 TABLET ORAL at 09:12

## 2018-11-04 RX ADMIN — BACITRACIN SCH MLS/HR: 5000 INJECTION, POWDER, FOR SOLUTION INTRAMUSCULAR at 00:06

## 2018-11-04 RX ADMIN — BACITRACIN SCH MLS/HR: 5000 INJECTION, POWDER, FOR SOLUTION INTRAMUSCULAR at 17:18

## 2018-11-04 RX ADMIN — LISINOPRIL SCH MG: 10 TABLET ORAL at 09:11

## 2018-11-04 RX ADMIN — KETOROLAC TROMETHAMINE PRN MG: 15 INJECTION, SOLUTION INTRAMUSCULAR; INTRAVENOUS at 01:31

## 2018-11-04 RX ADMIN — PREGABALIN SCH MG: 50 CAPSULE ORAL at 20:41

## 2018-11-04 RX ADMIN — MORPHINE SULFATE PRN MG: 4 INJECTION, SOLUTION INTRAMUSCULAR; INTRAVENOUS at 10:13

## 2018-11-04 RX ADMIN — MORPHINE SULFATE PRN MG: 4 INJECTION, SOLUTION INTRAMUSCULAR; INTRAVENOUS at 01:32

## 2018-11-04 RX ADMIN — MORPHINE SULFATE PRN MG: 4 INJECTION, SOLUTION INTRAMUSCULAR; INTRAVENOUS at 16:50

## 2018-11-04 RX ADMIN — INSULIN LISPRO SCH UNITS: 100 INJECTION, SOLUTION INTRAVENOUS; SUBCUTANEOUS at 16:56

## 2018-11-04 RX ADMIN — PREGABALIN SCH MG: 50 CAPSULE ORAL at 09:10

## 2018-11-04 RX ADMIN — MORPHINE SULFATE PRN MG: 4 INJECTION, SOLUTION INTRAMUSCULAR; INTRAVENOUS at 06:11

## 2018-11-04 RX ADMIN — INSULIN GLARGINE SCH UNITS: 100 INJECTION, SOLUTION SUBCUTANEOUS at 20:48

## 2018-11-04 RX ADMIN — CIPROFLOXACIN SCH MLS/HR: 2 INJECTION INTRAVENOUS at 09:12

## 2018-11-04 RX ADMIN — INSULIN LISPRO SCH UNITS: 100 INJECTION, SOLUTION INTRAVENOUS; SUBCUTANEOUS at 11:50

## 2018-11-04 RX ADMIN — LOSARTAN POTASSIUM SCH MG: 50 TABLET ORAL at 09:09

## 2018-11-04 RX ADMIN — MORPHINE SULFATE PRN MG: 4 INJECTION, SOLUTION INTRAMUSCULAR; INTRAVENOUS at 20:41

## 2018-11-04 RX ADMIN — KETOROLAC TROMETHAMINE PRN MG: 15 INJECTION, SOLUTION INTRAMUSCULAR; INTRAVENOUS at 16:51

## 2018-11-04 RX ADMIN — INSULIN LISPRO SCH UNITS: 100 INJECTION, SOLUTION INTRAVENOUS; SUBCUTANEOUS at 08:00

## 2018-11-04 NOTE — PDOC
PROGRESS NOTES


Chief Complaint


Chief Complaint


Abdominal Pain


Acute blood loss -  LGIB


Migraines


DM - with A1c of 12.6


FV leiden


Supratherapeutic INR


Fall


Transaminitis





History of Present Illness


History of Present Illness


42 yo F w/ PMHx DM, migraines, factor V Leiden deficiency (multiple PE, DVT on 

lifelong anticoagulation with coumadin) p/w abdominal pain. Onset 2 days ago, 

associated with blood in stool


Pain is cramping, colicky diffuse bilateral upper quadrant radiates to the back 

also some pain with moving her left hip. 





Her INR was 6.2 this morning and had positive occult blood in stool. Still with 

pain, feels it is somewhat improved. Blood sugar has been difficult, apparently 

missed scheduled insulin last night, catching up today.





A/P:


Abdominal Pain - likely colitis on CT, will treat as infectious, cipro + 

flagyl. Was given aztreonam as she stated she is allergic to multiple 

antibiotics and records were incomplete. Will consult GI as there is a LGIB as 

well


Acute blood loss - LGIB apparently based on BRBPR for days. Hb stable, VS 

stable. Will consult GI, repeat CBC


Migraines - currently with HA, will use imitrex prn, toradol 15mg, compazine prn


DM - with A1c of 12.6, only had diagnosis for 4 years, on metformin and 

insulin. Will cont and add sliding scale and meal coverage


FV leiden - with h/o multiple PE, will need to continue coumadin therapy with 

goal INR 2-3


Supratherapeutic INR - will monitor daily, as she had GI bleeding was given 

vitamin K in ED


Fall - CT head negative, will treat headache


Transaminitis - likely related to hyperglycemia/fatty liver, TSH 4.7, consider 

iron studies





FEN - ADA diet


PPX - coumadin


FULL CODE


Inpatient for at least 2 midnights for her GI bleed and colitis





Vitals


Vitals





Vital Signs








  Date Time  Temp Pulse Resp B/P (MAP) Pulse Ox O2 Delivery O2 Flow Rate FiO2


 


11/4/18 06:11   20  93 Room Air  


 


11/4/18 03:00 97.8 68  120/63 (82)    





 97.8       











Physical Exam


General:  Alert, Oriented X3, Cooperative, No acute distress


Abdomen:  Normal bowel sounds, Soft, Other (Tenderness in bilateral upper 

quadrants, epigastrium and LLQ)


Extremities:  No clubbing, No cyanosis, No edema, Normal pulses, No tenderness/

swelling


Skin:  No rashes, No breakdown, No significant lesion





Labs


LABS





Laboratory Tests








Test


 11/3/18


19:57 11/3/18


20:08 11/3/18


20:18 11/3/18


21:08


 


Urine Collection Type Unknown    


 


Urine Color Yellow    


 


Urine Clarity Clear    


 


Urine pH 5.0    


 


Urine Specific Gravity >=1.030    


 


Urine Protein


 Negative mg/dL


(NEG-TRACE) 


 


 





 


Urine Glucose (UA)


 >=1000 mg/dL


(NEG) 


 


 





 


Urine Ketones (Stick)


 Negative mg/dL


(NEG) 


 


 





 


Urine Blood Negative (NEG)    


 


Urine Nitrite Negative (NEG)    


 


Urine Bilirubin Negative (NEG)    


 


Urine Urobilinogen Dipstick


 0.2 mg/dL (0.2


mg/dL) 


 


 





 


Urine Leukocyte Esterase Negative (NEG)    


 


Urine RBC 0 /HPF (0-2)    


 


Urine WBC 0 /HPF (0-4)    


 


Urine Squamous Epithelial


Cells Mod /LPF 


 


 


 





 


Urine Bacteria


 Few /HPF


(0-FEW) 


 


 





 


Bedside Urine HCG, Qualitative


 


 Hcg negative


(Negative) 


 





 


White Blood Count


 


 


 5.5 x10^3/uL


(4.0-11.0) 





 


Red Blood Count


 


 


 4.78 x10^6/uL


(3.50-5.40) 





 


Hemoglobin


 


 


 14.5 g/dL


(12.0-15.5) 





 


Hematocrit


 


 


 40.2 %


(36.0-47.0) 





 


Mean Corpuscular Volume   84 fL ()  


 


Mean Corpuscular Hemoglobin   30 pg (25-35)  


 


Mean Corpuscular Hemoglobin


Concent 


 


 36 g/dL


(31-37) 





 


Red Cell Distribution Width


 


 


 13.3 %


(11.5-14.5) 





 


Platelet Count


 


 


 219 x10^3/uL


(140-400) 





 


Neutrophils (%) (Auto)   60 % (31-73)  


 


Lymphocytes (%) (Auto)   31 % (24-48)  


 


Monocytes (%) (Auto)   7 % (0-9)  


 


Eosinophils (%) (Auto)   1 % (0-3)  


 


Basophils (%) (Auto)   1 % (0-3)  


 


Neutrophils # (Auto)


 


 


 3.3 x10^3uL


(1.8-7.7) 





 


Lymphocytes # (Auto)


 


 


 1.7 x10^3/uL


(1.0-4.8) 





 


Monocytes # (Auto)


 


 


 0.4 x10^3/uL


(0.0-1.1) 





 


Eosinophils # (Auto)


 


 


 0.1 x10^3/uL


(0.0-0.7) 





 


Basophils # (Auto)


 


 


 0.1 x10^3/uL


(0.0-0.2) 





 


Prothrombin Time


 


 


 48.9 SEC


(11.7-14.0) 





 


Prothromb Time International


Ratio 


 


 5.5 (0.8-1.1) 


 





 


Sodium Level


 


 


 135 mmol/L


(136-145) 





 


Potassium Level


 


 


 4.0 mmol/L


(3.5-5.1) 





 


Chloride Level


 


 


 99 mmol/L


() 





 


Carbon Dioxide Level


 


 


 25 mmol/L


(21-32) 





 


Anion Gap   11 (6-14)  


 


Blood Urea Nitrogen   8 mg/dL (7-20)  


 


Creatinine


 


 


 0.8 mg/dL


(0.6-1.0) 





 


Estimated GFR


(Cockcroft-Gault) 


 


 78.7 


 





 


BUN/Creatinine Ratio   10 (6-20)  


 


Glucose Level


 


 


 426 mg/dL


(70-99) 





 


Calcium Level


 


 


 9.4 mg/dL


(8.5-10.1) 





 


Total Bilirubin


 


 


 0.2 mg/dL


(0.2-1.0) 





 


Aspartate Amino Transf


(AST/SGOT) 


 


 28 U/L (15-37) 


 





 


Alanine Aminotransferase


(ALT/SGPT) 


 


 63 U/L (14-59) 


 





 


Alkaline Phosphatase


 


 


 81 U/L


() 





 


Total Protein


 


 


 7.7 g/dL


(6.4-8.2) 





 


Albumin


 


 


 3.3 g/dL


(3.4-5.0) 





 


Albumin/Globulin Ratio   0.8 (1.0-1.7)  


 


Lipase


 


 


 183 U/L


() 





 


Stool Occult Blood    Positive (NEG) 











Assessment and Plan


Assessmemt and Plan


Problems


Medical Problems:


(1) Coumadin toxicity


Status: Acute  











Comment


Review of Relevant


I have reviewed the following items tucker (where applicable) has been applied.


Labs





Laboratory Tests








Test


 11/3/18


19:57 11/3/18


20:08 11/3/18


20:18 11/3/18


21:08


 


Urine Collection Type Unknown    


 


Urine Color Yellow    


 


Urine Clarity Clear    


 


Urine pH 5.0    


 


Urine Specific Gravity >=1.030    


 


Urine Protein


 Negative mg/dL


(NEG-TRACE) 


 


 





 


Urine Glucose (UA)


 >=1000 mg/dL


(NEG) 


 


 





 


Urine Ketones (Stick)


 Negative mg/dL


(NEG) 


 


 





 


Urine Blood Negative (NEG)    


 


Urine Nitrite Negative (NEG)    


 


Urine Bilirubin Negative (NEG)    


 


Urine Urobilinogen Dipstick


 0.2 mg/dL (0.2


mg/dL) 


 


 





 


Urine Leukocyte Esterase Negative (NEG)    


 


Urine RBC 0 /HPF (0-2)    


 


Urine WBC 0 /HPF (0-4)    


 


Urine Squamous Epithelial


Cells Mod /LPF 


 


 


 





 


Urine Bacteria


 Few /HPF


(0-FEW) 


 


 





 


Bedside Urine HCG, Qualitative


 


 Hcg negative


(Negative) 


 





 


White Blood Count


 


 


 5.5 x10^3/uL


(4.0-11.0) 





 


Red Blood Count


 


 


 4.78 x10^6/uL


(3.50-5.40) 





 


Hemoglobin


 


 


 14.5 g/dL


(12.0-15.5) 





 


Hematocrit


 


 


 40.2 %


(36.0-47.0) 





 


Mean Corpuscular Volume   84 fL ()  


 


Mean Corpuscular Hemoglobin   30 pg (25-35)  


 


Mean Corpuscular Hemoglobin


Concent 


 


 36 g/dL


(31-37) 





 


Red Cell Distribution Width


 


 


 13.3 %


(11.5-14.5) 





 


Platelet Count


 


 


 219 x10^3/uL


(140-400) 





 


Neutrophils (%) (Auto)   60 % (31-73)  


 


Lymphocytes (%) (Auto)   31 % (24-48)  


 


Monocytes (%) (Auto)   7 % (0-9)  


 


Eosinophils (%) (Auto)   1 % (0-3)  


 


Basophils (%) (Auto)   1 % (0-3)  


 


Neutrophils # (Auto)


 


 


 3.3 x10^3uL


(1.8-7.7) 





 


Lymphocytes # (Auto)


 


 


 1.7 x10^3/uL


(1.0-4.8) 





 


Monocytes # (Auto)


 


 


 0.4 x10^3/uL


(0.0-1.1) 





 


Eosinophils # (Auto)


 


 


 0.1 x10^3/uL


(0.0-0.7) 





 


Basophils # (Auto)


 


 


 0.1 x10^3/uL


(0.0-0.2) 





 


Prothrombin Time


 


 


 48.9 SEC


(11.7-14.0) 





 


Prothromb Time International


Ratio 


 


 5.5 (0.8-1.1) 


 





 


Sodium Level


 


 


 135 mmol/L


(136-145) 





 


Potassium Level


 


 


 4.0 mmol/L


(3.5-5.1) 





 


Chloride Level


 


 


 99 mmol/L


() 





 


Carbon Dioxide Level


 


 


 25 mmol/L


(21-32) 





 


Anion Gap   11 (6-14)  


 


Blood Urea Nitrogen   8 mg/dL (7-20)  


 


Creatinine


 


 


 0.8 mg/dL


(0.6-1.0) 





 


Estimated GFR


(Cockcroft-Gault) 


 


 78.7 


 





 


BUN/Creatinine Ratio   10 (6-20)  


 


Glucose Level


 


 


 426 mg/dL


(70-99) 





 


Calcium Level


 


 


 9.4 mg/dL


(8.5-10.1) 





 


Total Bilirubin


 


 


 0.2 mg/dL


(0.2-1.0) 





 


Aspartate Amino Transf


(AST/SGOT) 


 


 28 U/L (15-37) 


 





 


Alanine Aminotransferase


(ALT/SGPT) 


 


 63 U/L (14-59) 


 





 


Alkaline Phosphatase


 


 


 81 U/L


() 





 


Total Protein


 


 


 7.7 g/dL


(6.4-8.2) 





 


Albumin


 


 


 3.3 g/dL


(3.4-5.0) 





 


Albumin/Globulin Ratio   0.8 (1.0-1.7)  


 


Lipase


 


 


 183 U/L


() 





 


Stool Occult Blood    Positive (NEG) 








Laboratory Tests








Test


 11/3/18


19:57 11/3/18


20:08 11/3/18


20:18 11/3/18


21:08


 


Urine Collection Type Unknown    


 


Urine Color Yellow    


 


Urine Clarity Clear    


 


Urine pH 5.0    


 


Urine Specific Gravity >=1.030    


 


Urine Protein


 Negative mg/dL


(NEG-TRACE) 


 


 





 


Urine Glucose (UA)


 >=1000 mg/dL


(NEG) 


 


 





 


Urine Ketones (Stick)


 Negative mg/dL


(NEG) 


 


 





 


Urine Blood Negative (NEG)    


 


Urine Nitrite Negative (NEG)    


 


Urine Bilirubin Negative (NEG)    


 


Urine Urobilinogen Dipstick


 0.2 mg/dL (0.2


mg/dL) 


 


 





 


Urine Leukocyte Esterase Negative (NEG)    


 


Urine RBC 0 /HPF (0-2)    


 


Urine WBC 0 /HPF (0-4)    


 


Urine Squamous Epithelial


Cells Mod /LPF 


 


 


 





 


Urine Bacteria


 Few /HPF


(0-FEW) 


 


 





 


Bedside Urine HCG, Qualitative


 


 Hcg negative


(Negative) 


 





 


White Blood Count


 


 


 5.5 x10^3/uL


(4.0-11.0) 





 


Red Blood Count


 


 


 4.78 x10^6/uL


(3.50-5.40) 





 


Hemoglobin


 


 


 14.5 g/dL


(12.0-15.5) 





 


Hematocrit


 


 


 40.2 %


(36.0-47.0) 





 


Mean Corpuscular Volume   84 fL ()  


 


Mean Corpuscular Hemoglobin   30 pg (25-35)  


 


Mean Corpuscular Hemoglobin


Concent 


 


 36 g/dL


(31-37) 





 


Red Cell Distribution Width


 


 


 13.3 %


(11.5-14.5) 





 


Platelet Count


 


 


 219 x10^3/uL


(140-400) 





 


Neutrophils (%) (Auto)   60 % (31-73)  


 


Lymphocytes (%) (Auto)   31 % (24-48)  


 


Monocytes (%) (Auto)   7 % (0-9)  


 


Eosinophils (%) (Auto)   1 % (0-3)  


 


Basophils (%) (Auto)   1 % (0-3)  


 


Neutrophils # (Auto)


 


 


 3.3 x10^3uL


(1.8-7.7) 





 


Lymphocytes # (Auto)


 


 


 1.7 x10^3/uL


(1.0-4.8) 





 


Monocytes # (Auto)


 


 


 0.4 x10^3/uL


(0.0-1.1) 





 


Eosinophils # (Auto)


 


 


 0.1 x10^3/uL


(0.0-0.7) 





 


Basophils # (Auto)


 


 


 0.1 x10^3/uL


(0.0-0.2) 





 


Prothrombin Time


 


 


 48.9 SEC


(11.7-14.0) 





 


Prothromb Time International


Ratio 


 


 5.5 (0.8-1.1) 


 





 


Sodium Level


 


 


 135 mmol/L


(136-145) 





 


Potassium Level


 


 


 4.0 mmol/L


(3.5-5.1) 





 


Chloride Level


 


 


 99 mmol/L


() 





 


Carbon Dioxide Level


 


 


 25 mmol/L


(21-32) 





 


Anion Gap   11 (6-14)  


 


Blood Urea Nitrogen   8 mg/dL (7-20)  


 


Creatinine


 


 


 0.8 mg/dL


(0.6-1.0) 





 


Estimated GFR


(Cockcroft-Gault) 


 


 78.7 


 





 


BUN/Creatinine Ratio   10 (6-20)  


 


Glucose Level


 


 


 426 mg/dL


(70-99) 





 


Calcium Level


 


 


 9.4 mg/dL


(8.5-10.1) 





 


Total Bilirubin


 


 


 0.2 mg/dL


(0.2-1.0) 





 


Aspartate Amino Transf


(AST/SGOT) 


 


 28 U/L (15-37) 


 





 


Alanine Aminotransferase


(ALT/SGPT) 


 


 63 U/L (14-59) 


 





 


Alkaline Phosphatase


 


 


 81 U/L


() 





 


Total Protein


 


 


 7.7 g/dL


(6.4-8.2) 





 


Albumin


 


 


 3.3 g/dL


(3.4-5.0) 





 


Albumin/Globulin Ratio   0.8 (1.0-1.7)  


 


Lipase


 


 


 183 U/L


() 





 


Stool Occult Blood    Positive (NEG) 








Medications





Current Medications


Fentanyl Citrate (Fentanyl 2ml Vial) 50 mcg 1X  ONCE IV  Last administered on 11

/3/18at 20:24;  Start 11/3/18 at 20:15;  Stop 11/3/18 at 20:16;  Status DC


Iohexol (Omnipaque 300 Mg/ml) 100 ml STK-MED ONCE .ROUTE ;  Start 11/3/18 at 21:

08;  Stop 11/3/18 at 21:09;  Status DC


Metronidazole 100 ml @  100 mls/hr 1X  ONCE IV  Last administered on 11/3/18at 

23:04;  Start 11/3/18 at 22:30;  Stop 11/3/18 at 23:29;  Status DC


Aztreonam 1 gm/ Dextrose 50 ml @  100 mls/hr Q6HRS IV ;  Start 11/4/18 at 06:00

;  Stop 11/4/18 at 06:00;  Status DC


Sodium Chloride 1,000 ml @  1,000 mls/hr 1X  ONCE IV  Last administered on 11/3/

18at 23:04;  Start 11/3/18 at 22:30;  Stop 11/3/18 at 23:29;  Status DC


Phytonadione (Mephyton Oral Soln) 2.5 mg 1X  ONCE PO  Last administered on 11/3/

18at 23:05;  Start 11/3/18 at 22:45;  Stop 11/3/18 at 22:46;  Status DC


Morphine Sulfate (Morphine Sulfate) 4 mg PRN Q2HR  PRN IV PAIN Last 

administered on 11/4/18at 06:11;  Start 11/3/18 at 22:45;  Stop 11/4/18 at 22:44


Sodium Chloride 1,000 ml @  100 mls/hr Q10H IV  Last administered on 11/4/18at 

00:06;  Start 11/3/18 at 22:45;  Stop 11/4/18 at 22:44


Aztreonam 1 gm/ Dextrose 50 ml @  100 mls/hr ONCE  ONCE IV  Last administered 

on 11/4/18at 00:11;  Start 11/3/18 at 23:00;  Stop 11/3/18 at 23:29;  Status DC


Ondansetron HCl (Zofran) 4 mg PRN Q6HRS  PRN IV NAUSEA/VOMITING;  Start 11/4/18 

at 01:00


Prochlorperazine Edisylate (Compazine) 10 mg PRN Q6HRS  PRN IV NAUSEA/VOMITING;

  Start 11/4/18 at 01:00


Oxycodone HCl (Roxicodone) 5 mg PRN Q3HRS  PRN PO BREAKTHROUGH PAIN;  Start 11/4 /18 at 01:00


Ketorolac Tromethamine (Toradol 15mg Vial) 15 mg PRN Q6HRS  PRN IV PAIN Last 

administered on 11/4/18at 01:31;  Start 11/4/18 at 01:00;  Stop 11/9/18 at 00:59


Lactulose (Lactulose) 20 gm PRN Q12HR  PRN PO CONSTIPATION;  Start 11/4/18 at 01

:00


Insulin Human Lispro (HumaLOG) 0-9 UNITS TIDWMEALS SQ ;  Start 11/4/18 at 08:00


Dextrose (Dextrose 50%-Water Syringe) 12.5 gm PRN Q15MIN  PRN IV SEE COMMENTS;  

Start 11/4/18 at 01:00


Clonazepam (KlonoPIN) 0.5 mg DAILY PO ;  Start 11/4/18 at 09:00


Insulin Glargine (Lantus) 12 units QHS SQ ;  Start 11/4/18 at 01:15


Pregabalin (Lyrica) 50 mg BID PO ;  Start 11/4/18 at 09:00


Sertraline HCl (Zoloft) 50 mg DAILY PO ;  Start 11/4/18 at 09:00


Losartan Potassium (Cozaar) 100 mg DAILY PO ;  Start 11/4/18 at 09:00


Non-Formulary Medication (Quinapril/ Hydrochlorothiazide (Quinapril-Hctz 10-

12.5 Mg Tab)) 1 each DAILY PO ;  Start 11/4/18 at 09:00;  Status UNV


Atorvastatin Calcium (Lipitor) 40 mg HS PO ;  Start 11/4/18 at 21:00


Sumatriptan Succinate (Imitrex) 100 mg PRN Q2HR  PRN PO MIGRAINE HEADACHE;  

Start 11/4/18 at 01:00


Metronidazole 100 ml @  100 mls/hr Q8HRS IV  Last administered on 11/4/18at 06:

12;  Start 11/4/18 at 06:00


Ciprofloxacin/ Dextrose 100 ml @  100 mls/hr Q12HR IV  Last administered on 11/4 /18at 01:30;  Start 11/4/18 at 01:30


Lisinopril (Prinivil) 10 mg DAILY PO ;  Start 11/4/18 at 09:00


Hydrochlorothiazide (Microzide) 12.5 mg DAILY PO ;  Start 11/4/18 at 09:00


Iohexol (Omnipaque 300 Mg/ml) 100 ml STK-MED ONCE .ROUTE ;  Start 11/4/18 at 03:

45;  Stop 11/4/18 at 03:46;  Status DC





Active Scripts


Active


Reported


Atacand (Candesartan Cilexetil) 32 Mg Tablet 1 Tab PO DAILY


Quinapril-Hctz 10-12.5 Mg Tab (Quinapril/Hydrochlorothiazide) 1 Each Tablet 1 

Each PO DAILY


Relpax (Eletriptan Hbr) 20 Mg Tablet 20 Mg PO 


Reglan (Metoclopramide Hcl) 10 Mg Tablet 10 Mg PO TIDWMEALHC


Lyrica (Pregabalin) 50 Mg Capsule 50 Mg PO BID


Clonazepam 0.5 Mg Tablet 1 Tab PO DAILY


Zoloft (Sertraline Hcl) 50 Mg Tablet 1 Tab PO DAILY


Crestor (Rosuvastatin Calcium) 10 Mg Tablet 1 Tab PO DAILY


Lantus Solostar (Insulin Glargine,Hum.rec.anlog) 100 Unit/1 Ml Insuln.pen 10 

Unit SQ QHS


Vitals/I & O





Vital Sign - Last 24 Hours








 11/3/18 11/3/18 11/3/18 11/3/18





 20:07 20:54 21:00 22:00


 


Temp 98.2   





 98.2   


 


Pulse 92  90 72


 


Resp 20  16 16


 


B/P (MAP) 167/114 (131)  148/105 (119) 109/53 (71)


 


Pulse Ox 97  100 100


 


O2 Delivery Room Air clearing fo Eddie Room Air Room Air


 


    





    





 11/3/18 11/3/18 11/3/18 11/3/18





 22:30 23:00 23:30 23:30


 


Pulse 72 76  70


 


Resp 16 16  16


 


B/P (MAP) 114/56 (75) 117/65 (82)  125/78 (94)


 


Pulse Ox 100 100 97 100


 


O2 Delivery Room Air Room Air  Room Air





 11/3/18 11/3/18 11/4/18 11/4/18





 23:45 23:55 01:32 02:02


 


Temp  98.0  





  98.0  


 


Pulse 80 79  


 


Resp 16 18 20 20


 


B/P (MAP) 126/69 (88) 159/88 (111)  


 


Pulse Ox 100 97 100 


 


O2 Delivery Room Air Room Air Room Air Room Air


 


    





    





 11/4/18 11/4/18 11/4/18 





 02:07 03:00 06:11 


 


Temp  97.8  





  97.8  


 


Pulse  68  


 


Resp  18 20 


 


B/P (MAP)  120/63 (82)  


 


Pulse Ox  93 93 


 


O2 Delivery Room Air Room Air Room Air 














Intake and Output   


 


 11/3/18 11/3/18 11/4/18





 15:00 23:00 07:00


 


Intake Total   200 ml


 


Balance   200 ml

















JEFFERSON WRIGHT MD Nov 4, 2018 07:14

## 2018-11-04 NOTE — CONS
DATE OF CONSULTATION:  11/04/2018



GASTROENTEROLOGY CONSULTATION



REASON FOR CONSULTATION:  Left lower quadrant abdominal pain, abnormal CAT scan

and rectal bleeding.



HISTORY OF PRESENT ILLNESS:  A 43-year-old  female whose past medical

history is significant for diabetes, migraines, factor V Leiden deficiency with

multiple pulmonary emboli and left lung coagulation, who is admitted to

[facility] with left lower abdominal pain of approximately 4

days' duration.  Some loose stools were encountered, but no linnette diarrhea.  CT

scan did reveal thickening of the descending and sigmoid colon.  Family history

is unrevealing for colon polyps, colon cancer or inflammatory bowel disease. 

She denies any extraintestinal manifestations or IBD at this time.  With

continued symptoms, she has been admitted with an INR of over 10 on admission,

which has been corrected now to 5.5, ongoing issues.  GI consultation is

requested.



PAST MEDICAL HISTORY:  Diabetes, migraines and factor V Leiden deficiency with

multiple PEs and DVTs.



ALLERGIES:  PENICILLIN, VANCOMYCIN AND ACETAMINOPHEN.



MEDICATIONS:  Presently include Lipitor, Microzide, Prinivil, Cozaar, Zoloft,

Lyrica, Klonopin, insulin, Flagyl and Cipro.



SOCIAL HISTORY:  She lives with her daughter.  She does not drink or smoke at

this time.



FAMILY HISTORY:  Otherwise, noncontributory.



REVIEW OF SYSTEMS:  Per records.



PHYSICAL EXAMINATION:

VITAL SIGNS:  Temperature is 97.3, pulse is 60, respirations are 16 and blood

pressure is 108/52.

HEENT EXAMINATION:  Normocephalic and atraumatic head.  Pupils and extraocular

muscles are not tested.  Sclerae anicteric.

NECK:  Supple.

LUNGS:  Clear.

CARDIOVASCULAR EXAMINATION:  Reveals an S1 and S2, without S3, S4 or appreciable

murmur.

ABDOMEN:  Examination reveals a soft abdomen.  Normal bowel sounds, without

appreciable hepatosplenomegaly.  Left lower quadrant tenderness to palpation.

EXTREMITIES:  Examination reveals no cyanosis, clubbing or edema.



LABORATORY STUDIES:  INR is 6.2.  Hemoglobin is 14.5, hematocrit 48.2, white

count 5.5 and platelet count is 219,000.  Sodium 136, potassium 4.0, chloride

102, BUN 9, creatinine 0.6, glucose is 360 and calcium 8.0.  Total bilirubin

0.3, AST 35, ALT of 61 and alkaline phosphatase 72.  Total protein 6.5, albumin

2.9.  TSH of 4.7.  Lipase of 183.  CT scan does reveal segmental wall thickening

of the descending colon and sigmoid colon.  No pneumatosis, free air or free

fluid.



IMPRESSION:  Abdominal pain with abnormal CT scan, most likely secondary to

infectious colitis, ischemic colitis, diverticulitis, new-onset Crohn's disease,

colon cancer, colon polyps or other differential.  We will recommend correction

of her coagulopathy to a level reasonable for factor V Leiden deficiency,

antibiotic therapy and inpatient colonoscopy if the pain persists with therapy;

otherwise, outpatient colonoscopy to further assess the bleeding.

 



______________________________

FLORINDA BHAGAT MD



DR:  SSP/chau  JOB#:  [id number] / [id number]

DD:  11/04/2018 12:57  DT:  11/04/2018 22:28

## 2018-11-04 NOTE — PDOC2
CONSULT


Date of Consult


Date of Consult


DATE: 11/4/18 


TIME: 12:52





Reason for Consult


Reason for Consult:


LLQ abd pain/abnl CT scan





Past Medical History


Cardiovascular:  No pertinent hx


Pulmonary:  No pertinent hx


CENTRAL NERVOUS SYSTEM:  Migraine


GI:  No pertinent hx


Heme/Onc:  Other (FV Leiden deficiency)


Hepatobiliary:  No pertinent hx


Psych:  No pertinent hx


Rheumatologic:  No pertinent hx


Infectious disease:  No pertinent hx


ENT:  No pertinent hx


Renal/:  No pertinent hx


Endocrine:  Diabetes


Dermatology:  No pertinent hx





Past Surgical History


Past Surgical History:  Cholecystectomy, Tubal Ligation





Family History


Family History:  Diabetes, High Cholestrol, Migranes





Social History


Social History:  Grandparents (FV leiden)


No


ALCOHOL:  rare


Drugs:  None





Current Problem List


Problem List


Problems


Medical Problems:


(1) Coumadin toxicity


Status: Acute  











Current Medications


Current Medications





Current Medications


Fentanyl Citrate (Fentanyl 2ml Vial) 50 mcg 1X  ONCE IV  Last administered on 11

/3/18at 20:24;  Start 11/3/18 at 20:15;  Stop 11/3/18 at 20:16;  Status DC


Iohexol (Omnipaque 300 Mg/ml) 100 ml STK-MED ONCE .ROUTE ;  Start 11/3/18 at 21:

08;  Stop 11/3/18 at 21:09;  Status DC


Metronidazole 100 ml @  100 mls/hr 1X  ONCE IV  Last administered on 11/3/18at 

23:04;  Start 11/3/18 at 22:30;  Stop 11/3/18 at 23:29;  Status DC


Aztreonam 1 gm/ Dextrose 50 ml @  100 mls/hr Q6HRS IV ;  Start 11/4/18 at 06:00

;  Stop 11/4/18 at 06:00;  Status DC


Sodium Chloride 1,000 ml @  1,000 mls/hr 1X  ONCE IV  Last administered on 11/3/

18at 23:04;  Start 11/3/18 at 22:30;  Stop 11/3/18 at 23:29;  Status DC


Phytonadione (Mephyton Oral Soln) 2.5 mg 1X  ONCE PO  Last administered on 11/3/

18at 23:05;  Start 11/3/18 at 22:45;  Stop 11/3/18 at 22:46;  Status DC


Morphine Sulfate (Morphine Sulfate) 4 mg PRN Q2HR  PRN IV PAIN Last 

administered on 11/4/18at 10:13;  Start 11/3/18 at 22:45;  Stop 11/4/18 at 22:44


Sodium Chloride 1,000 ml @  100 mls/hr Q10H IV  Last administered on 11/4/18at 

09:12;  Start 11/3/18 at 22:45;  Stop 11/4/18 at 22:44


Aztreonam 1 gm/ Dextrose 50 ml @  100 mls/hr ONCE  ONCE IV  Last administered 

on 11/4/18at 00:11;  Start 11/3/18 at 23:00;  Stop 11/3/18 at 23:29;  Status DC


Ondansetron HCl (Zofran) 4 mg PRN Q6HRS  PRN IV NAUSEA/VOMITING;  Start 11/4/18 

at 01:00


Prochlorperazine Edisylate (Compazine) 10 mg PRN Q6HRS  PRN IV NAUSEA/VOMITING;

  Start 11/4/18 at 01:00


Oxycodone HCl (Roxicodone) 5 mg PRN Q3HRS  PRN PO BREAKTHROUGH PAIN;  Start 11/4 /18 at 01:00


Ketorolac Tromethamine (Toradol 15mg Vial) 15 mg PRN Q6HRS  PRN IV PAIN Last 

administered on 11/4/18at 10:13;  Start 11/4/18 at 01:00;  Stop 11/9/18 at 00:59


Lactulose (Lactulose) 20 gm PRN Q12HR  PRN PO CONSTIPATION;  Start 11/4/18 at 01

:00


Insulin Human Lispro (HumaLOG) 0-9 UNITS TIDWMEALS SQ  Last administered on 11/4 /18at 11:50;  Start 11/4/18 at 08:00


Dextrose (Dextrose 50%-Water Syringe) 12.5 gm PRN Q15MIN  PRN IV SEE COMMENTS;  

Start 11/4/18 at 01:00


Clonazepam (KlonoPIN) 0.5 mg DAILY PO  Last administered on 11/4/18at 09:11;  

Start 11/4/18 at 09:00


Insulin Glargine (Lantus) 12 units QHS SQ ;  Start 11/4/18 at 01:15


Pregabalin (Lyrica) 50 mg BID PO  Last administered on 11/4/18at 09:10;  Start 

11/4/18 at 09:00


Sertraline HCl (Zoloft) 50 mg DAILY PO  Last administered on 11/4/18at 09:12;  

Start 11/4/18 at 09:00


Losartan Potassium (Cozaar) 100 mg DAILY PO  Last administered on 11/4/18at 09:

09;  Start 11/4/18 at 09:00


Non-Formulary Medication (Quinapril/ Hydrochlorothiazide (Quinapril-Hctz 10-

12.5 Mg Tab)) 1 each DAILY PO ;  Start 11/4/18 at 09:00;  Status UNV


Atorvastatin Calcium (Lipitor) 40 mg HS PO ;  Start 11/4/18 at 21:00


Sumatriptan Succinate (Imitrex) 100 mg PRN Q2HR  PRN PO MIGRAINE HEADACHE;  

Start 11/4/18 at 01:00


Metronidazole 100 ml @  100 mls/hr Q8HRS IV  Last administered on 11/4/18at 06:

12;  Start 11/4/18 at 06:00


Ciprofloxacin/ Dextrose 100 ml @  100 mls/hr Q12HR IV  Last administered on 11/4 /18at 09:12;  Start 11/4/18 at 01:30


Lisinopril (Prinivil) 10 mg DAILY PO  Last administered on 11/4/18at 09:11;  

Start 11/4/18 at 09:00


Hydrochlorothiazide (Microzide) 12.5 mg DAILY PO  Last administered on 11/4/ 18at 09:11;  Start 11/4/18 at 09:00


Iohexol (Omnipaque 300 Mg/ml) 100 ml STK-MED ONCE .ROUTE ;  Start 11/4/18 at 03:

45;  Stop 11/4/18 at 03:46;  Status DC


Insulin Glargine (Lantus) 12 units 1X  ONCE SQ  Last administered on 11/4/18at 

09:17;  Start 11/4/18 at 09:00;  Stop 11/4/18 at 09:01;  Status DC


Insulin Human Lispro (HumaLOG) 12 units 1X  ONCE SQ  Last administered on 11/4/ 18at 09:17;  Start 11/4/18 at 09:00;  Stop 11/4/18 at 09:01;  Status DC





Active Scripts


Active


Reported


Atacand (Candesartan Cilexetil) 32 Mg Tablet 1 Tab PO DAILY


Quinapril-Hctz 10-12.5 Mg Tab (Quinapril/Hydrochlorothiazide) 1 Each Tablet 1 

Each PO DAILY


Relpax (Eletriptan Hbr) 20 Mg Tablet 20 Mg PO 


Reglan (Metoclopramide Hcl) 10 Mg Tablet 10 Mg PO TIDWMEALHC


Lyrica (Pregabalin) 50 Mg Capsule 50 Mg PO BID


Clonazepam 0.5 Mg Tablet 1 Tab PO DAILY


Zoloft (Sertraline Hcl) 50 Mg Tablet 1 Tab PO DAILY


Crestor (Rosuvastatin Calcium) 10 Mg Tablet 1 Tab PO DAILY


Lantus Solostar (Insulin Glargine,Hum.rec.anlog) 100 Unit/1 Ml Insuln.pen 10 

Unit SQ QHS





Allergies


Allergies:  


Coded Allergies:  


     acetaminophen (Verified  Allergy, Severe, THROAT SWELLS, 3/2/16)


     propoxyphene (Verified  Allergy, Severe, THROAT SWELLS, 3/2/16)


     Penicillins (Verified  Allergy, Intermediate, HIVES, 3/2/16)


     vancomycin (Verified  Allergy, Unknown, 11/4/18)





Vitals


VITALS





Vital Signs








  Date Time  Temp Pulse Resp B/P (MAP) Pulse Ox O2 Delivery O2 Flow Rate FiO2


 


11/4/18 11:51      Room Air  


 


11/4/18 11:00 97.3 60 16 108/52 (70) 97   





 97.3       











Labs


Labs





Laboratory Tests








Test


 11/3/18


19:57 11/3/18


20:08 11/3/18


20:18 11/3/18


21:08


 


Urine Collection Type Unknown    


 


Urine Color Yellow    


 


Urine Clarity Clear    


 


Urine pH 5.0    


 


Urine Specific Gravity >=1.030    


 


Urine Protein


 Negative mg/dL


(NEG-TRACE) 


 


 





 


Urine Glucose (UA)


 >=1000 mg/dL


(NEG) 


 


 





 


Urine Ketones (Stick)


 Negative mg/dL


(NEG) 


 


 





 


Urine Blood Negative (NEG)    


 


Urine Nitrite Negative (NEG)    


 


Urine Bilirubin Negative (NEG)    


 


Urine Urobilinogen Dipstick


 0.2 mg/dL (0.2


mg/dL) 


 


 





 


Urine Leukocyte Esterase Negative (NEG)    


 


Urine RBC 0 /HPF (0-2)    


 


Urine WBC 0 /HPF (0-4)    


 


Urine Squamous Epithelial


Cells Mod /LPF 


 


 


 





 


Urine Bacteria


 Few /HPF


(0-FEW) 


 


 





 


Bedside Urine HCG, Qualitative


 


 Hcg negative


(Negative) 


 





 


White Blood Count


 


 


 5.5 x10^3/uL


(4.0-11.0) 





 


Red Blood Count


 


 


 4.78 x10^6/uL


(3.50-5.40) 





 


Hemoglobin


 


 


 14.5 g/dL


(12.0-15.5) 





 


Hematocrit


 


 


 40.2 %


(36.0-47.0) 





 


Mean Corpuscular Volume   84 fL ()  


 


Mean Corpuscular Hemoglobin   30 pg (25-35)  


 


Mean Corpuscular Hemoglobin


Concent 


 


 36 g/dL


(31-37) 





 


Red Cell Distribution Width


 


 


 13.3 %


(11.5-14.5) 





 


Platelet Count


 


 


 219 x10^3/uL


(140-400) 





 


Neutrophils (%) (Auto)   60 % (31-73)  


 


Lymphocytes (%) (Auto)   31 % (24-48)  


 


Monocytes (%) (Auto)   7 % (0-9)  


 


Eosinophils (%) (Auto)   1 % (0-3)  


 


Basophils (%) (Auto)   1 % (0-3)  


 


Neutrophils # (Auto)


 


 


 3.3 x10^3uL


(1.8-7.7) 





 


Lymphocytes # (Auto)


 


 


 1.7 x10^3/uL


(1.0-4.8) 





 


Monocytes # (Auto)


 


 


 0.4 x10^3/uL


(0.0-1.1) 





 


Eosinophils # (Auto)


 


 


 0.1 x10^3/uL


(0.0-0.7) 





 


Basophils # (Auto)


 


 


 0.1 x10^3/uL


(0.0-0.2) 





 


Prothrombin Time


 


 


 48.9 SEC


(11.7-14.0) 





 


Prothromb Time International


Ratio 


 


 5.5 (0.8-1.1) 


 





 


Sodium Level


 


 


 135 mmol/L


(136-145) 





 


Potassium Level


 


 


 4.0 mmol/L


(3.5-5.1) 





 


Chloride Level


 


 


 99 mmol/L


() 





 


Carbon Dioxide Level


 


 


 25 mmol/L


(21-32) 





 


Anion Gap   11 (6-14)  


 


Blood Urea Nitrogen   8 mg/dL (7-20)  


 


Creatinine


 


 


 0.8 mg/dL


(0.6-1.0) 





 


Estimated GFR


(Cockcroft-Gault) 


 


 78.7 


 





 


BUN/Creatinine Ratio   10 (6-20)  


 


Glucose Level


 


 


 426 mg/dL


(70-99) 





 


Calcium Level


 


 


 9.4 mg/dL


(8.5-10.1) 





 


Total Bilirubin


 


 


 0.2 mg/dL


(0.2-1.0) 





 


Aspartate Amino Transf


(AST/SGOT) 


 


 28 U/L (15-37) 


 





 


Alanine Aminotransferase


(ALT/SGPT) 


 


 63 U/L (14-59) 


 





 


Alkaline Phosphatase


 


 


 81 U/L


() 





 


Total Protein


 


 


 7.7 g/dL


(6.4-8.2) 





 


Albumin


 


 


 3.3 g/dL


(3.4-5.0) 





 


Albumin/Globulin Ratio   0.8 (1.0-1.7)  


 


Lipase


 


 


 183 U/L


() 





 


Stool Occult Blood    Positive (NEG) 


 


Test


 11/4/18


06:45 11/4/18


07:47 11/4/18


11:22 





 


Prothrombin Time


 54.1 SEC


(11.7-14.0) 


 


 





 


Prothromb Time International


Ratio 6.2 (0.8-1.1) 


 


 


 





 


Sodium Level


 136 mmol/L


(136-145) 


 


 





 


Potassium Level


 4.0 mmol/L


(3.5-5.1) 


 


 





 


Chloride Level


 102 mmol/L


() 


 


 





 


Carbon Dioxide Level


 26 mmol/L


(21-32) 


 


 





 


Anion Gap 8 (6-14)    


 


Blood Urea Nitrogen 9 mg/dL (7-20)    


 


Creatinine


 0.6 mg/dL


(0.6-1.0) 


 


 





 


Estimated GFR


(Cockcroft-Gault) 109.6 


 


 


 





 


BUN/Creatinine Ratio 15 (6-20)    


 


Glucose Level


 360 mg/dL


(70-99) 


 


 





 


Calcium Level


 8.6 mg/dL


(8.5-10.1) 


 


 





 


Total Bilirubin


 0.3 mg/dL


(0.2-1.0) 


 


 





 


Aspartate Amino Transf


(AST/SGOT) 35 U/L (15-37) 


 


 


 





 


Alanine Aminotransferase


(ALT/SGPT) 61 U/L (14-59) 


 


 


 





 


Alkaline Phosphatase


 72 U/L


() 


 


 





 


Total Protein


 6.5 g/dL


(6.4-8.2) 


 


 





 


Albumin


 2.9 g/dL


(3.4-5.0) 


 


 





 


Albumin/Globulin Ratio 0.8 (1.0-1.7)    


 


Thyroid Stimulating Hormone


(TSH) 4.701 uIU/mL


(0.358-3.74) 


 


 





 


Glucose (Fingerstick)


 


 437 mg/dL


(70-99) 464 mg/dL


(70-99) 











Laboratory Tests








Test


 11/3/18


19:57 11/3/18


20:08 11/3/18


20:18 11/3/18


21:08


 


Urine Collection Type Unknown    


 


Urine Color Yellow    


 


Urine Clarity Clear    


 


Urine pH 5.0    


 


Urine Specific Gravity >=1.030    


 


Urine Protein


 Negative mg/dL


(NEG-TRACE) 


 


 





 


Urine Glucose (UA)


 >=1000 mg/dL


(NEG) 


 


 





 


Urine Ketones (Stick)


 Negative mg/dL


(NEG) 


 


 





 


Urine Blood Negative (NEG)    


 


Urine Nitrite Negative (NEG)    


 


Urine Bilirubin Negative (NEG)    


 


Urine Urobilinogen Dipstick


 0.2 mg/dL (0.2


mg/dL) 


 


 





 


Urine Leukocyte Esterase Negative (NEG)    


 


Urine RBC 0 /HPF (0-2)    


 


Urine WBC 0 /HPF (0-4)    


 


Urine Squamous Epithelial


Cells Mod /LPF 


 


 


 





 


Urine Bacteria


 Few /HPF


(0-FEW) 


 


 





 


Bedside Urine HCG, Qualitative


 


 Hcg negative


(Negative) 


 





 


White Blood Count


 


 


 5.5 x10^3/uL


(4.0-11.0) 





 


Red Blood Count


 


 


 4.78 x10^6/uL


(3.50-5.40) 





 


Hemoglobin


 


 


 14.5 g/dL


(12.0-15.5) 





 


Hematocrit


 


 


 40.2 %


(36.0-47.0) 





 


Mean Corpuscular Volume   84 fL ()  


 


Mean Corpuscular Hemoglobin   30 pg (25-35)  


 


Mean Corpuscular Hemoglobin


Concent 


 


 36 g/dL


(31-37) 





 


Red Cell Distribution Width


 


 


 13.3 %


(11.5-14.5) 





 


Platelet Count


 


 


 219 x10^3/uL


(140-400) 





 


Neutrophils (%) (Auto)   60 % (31-73)  


 


Lymphocytes (%) (Auto)   31 % (24-48)  


 


Monocytes (%) (Auto)   7 % (0-9)  


 


Eosinophils (%) (Auto)   1 % (0-3)  


 


Basophils (%) (Auto)   1 % (0-3)  


 


Neutrophils # (Auto)


 


 


 3.3 x10^3uL


(1.8-7.7) 





 


Lymphocytes # (Auto)


 


 


 1.7 x10^3/uL


(1.0-4.8) 





 


Monocytes # (Auto)


 


 


 0.4 x10^3/uL


(0.0-1.1) 





 


Eosinophils # (Auto)


 


 


 0.1 x10^3/uL


(0.0-0.7) 





 


Basophils # (Auto)


 


 


 0.1 x10^3/uL


(0.0-0.2) 





 


Prothrombin Time


 


 


 48.9 SEC


(11.7-14.0) 





 


Prothromb Time International


Ratio 


 


 5.5 (0.8-1.1) 


 





 


Sodium Level


 


 


 135 mmol/L


(136-145) 





 


Potassium Level


 


 


 4.0 mmol/L


(3.5-5.1) 





 


Chloride Level


 


 


 99 mmol/L


() 





 


Carbon Dioxide Level


 


 


 25 mmol/L


(21-32) 





 


Anion Gap   11 (6-14)  


 


Blood Urea Nitrogen   8 mg/dL (7-20)  


 


Creatinine


 


 


 0.8 mg/dL


(0.6-1.0) 





 


Estimated GFR


(Cockcroft-Gault) 


 


 78.7 


 





 


BUN/Creatinine Ratio   10 (6-20)  


 


Glucose Level


 


 


 426 mg/dL


(70-99) 





 


Calcium Level


 


 


 9.4 mg/dL


(8.5-10.1) 





 


Total Bilirubin


 


 


 0.2 mg/dL


(0.2-1.0) 





 


Aspartate Amino Transf


(AST/SGOT) 


 


 28 U/L (15-37) 


 





 


Alanine Aminotransferase


(ALT/SGPT) 


 


 63 U/L (14-59) 


 





 


Alkaline Phosphatase


 


 


 81 U/L


() 





 


Total Protein


 


 


 7.7 g/dL


(6.4-8.2) 





 


Albumin


 


 


 3.3 g/dL


(3.4-5.0) 





 


Albumin/Globulin Ratio   0.8 (1.0-1.7)  


 


Lipase


 


 


 183 U/L


() 





 


Stool Occult Blood    Positive (NEG) 


 


Test


 11/4/18


06:45 11/4/18


07:47 11/4/18


11:22 





 


Prothrombin Time


 54.1 SEC


(11.7-14.0) 


 


 





 


Prothromb Time International


Ratio 6.2 (0.8-1.1) 


 


 


 





 


Sodium Level


 136 mmol/L


(136-145) 


 


 





 


Potassium Level


 4.0 mmol/L


(3.5-5.1) 


 


 





 


Chloride Level


 102 mmol/L


() 


 


 





 


Carbon Dioxide Level


 26 mmol/L


(21-32) 


 


 





 


Anion Gap 8 (6-14)    


 


Blood Urea Nitrogen 9 mg/dL (7-20)    


 


Creatinine


 0.6 mg/dL


(0.6-1.0) 


 


 





 


Estimated GFR


(Cockcroft-Gault) 109.6 


 


 


 





 


BUN/Creatinine Ratio 15 (6-20)    


 


Glucose Level


 360 mg/dL


(70-99) 


 


 





 


Calcium Level


 8.6 mg/dL


(8.5-10.1) 


 


 





 


Total Bilirubin


 0.3 mg/dL


(0.2-1.0) 


 


 





 


Aspartate Amino Transf


(AST/SGOT) 35 U/L (15-37) 


 


 


 





 


Alanine Aminotransferase


(ALT/SGPT) 61 U/L (14-59) 


 


 


 





 


Alkaline Phosphatase


 72 U/L


() 


 


 





 


Total Protein


 6.5 g/dL


(6.4-8.2) 


 


 





 


Albumin


 2.9 g/dL


(3.4-5.0) 


 


 





 


Albumin/Globulin Ratio 0.8 (1.0-1.7)    


 


Thyroid Stimulating Hormone


(TSH) 4.701 uIU/mL


(0.358-3.74) 


 


 





 


Glucose (Fingerstick)


 


 437 mg/dL


(70-99) 464 mg/dL


(70-99) 














Assessment/Plan


Assessment/Plan


LLQ abd pain- with abnl CT scan, infectious colitis leads differential. 

Diverticulitis, new onset IBD, colon cancer, and/or ischemic colitis possible 

as well. 


Plan correct coagulopathy for Factor V deficiency


       antibiotics


       i/p colonoscopy if no improvement with medical therapy, otherwise o/p


Full note dictated











FLORINDA BHAGAT MD Nov 4, 2018 12:54

## 2018-11-05 VITALS — SYSTOLIC BLOOD PRESSURE: 125 MMHG | DIASTOLIC BLOOD PRESSURE: 77 MMHG

## 2018-11-05 VITALS — DIASTOLIC BLOOD PRESSURE: 76 MMHG | SYSTOLIC BLOOD PRESSURE: 134 MMHG

## 2018-11-05 VITALS — DIASTOLIC BLOOD PRESSURE: 79 MMHG | SYSTOLIC BLOOD PRESSURE: 129 MMHG

## 2018-11-05 VITALS — DIASTOLIC BLOOD PRESSURE: 77 MMHG | SYSTOLIC BLOOD PRESSURE: 125 MMHG

## 2018-11-05 VITALS — DIASTOLIC BLOOD PRESSURE: 68 MMHG | SYSTOLIC BLOOD PRESSURE: 117 MMHG

## 2018-11-05 VITALS — DIASTOLIC BLOOD PRESSURE: 71 MMHG | SYSTOLIC BLOOD PRESSURE: 135 MMHG

## 2018-11-05 VITALS — SYSTOLIC BLOOD PRESSURE: 124 MMHG | DIASTOLIC BLOOD PRESSURE: 81 MMHG

## 2018-11-05 VITALS — SYSTOLIC BLOOD PRESSURE: 117 MMHG | DIASTOLIC BLOOD PRESSURE: 66 MMHG

## 2018-11-05 VITALS — DIASTOLIC BLOOD PRESSURE: 63 MMHG | SYSTOLIC BLOOD PRESSURE: 113 MMHG

## 2018-11-05 VITALS — SYSTOLIC BLOOD PRESSURE: 109 MMHG | DIASTOLIC BLOOD PRESSURE: 70 MMHG

## 2018-11-05 VITALS — SYSTOLIC BLOOD PRESSURE: 110 MMHG | DIASTOLIC BLOOD PRESSURE: 72 MMHG

## 2018-11-05 VITALS — SYSTOLIC BLOOD PRESSURE: 119 MMHG | DIASTOLIC BLOOD PRESSURE: 63 MMHG

## 2018-11-05 VITALS — SYSTOLIC BLOOD PRESSURE: 132 MMHG | DIASTOLIC BLOOD PRESSURE: 87 MMHG

## 2018-11-05 VITALS — SYSTOLIC BLOOD PRESSURE: 101 MMHG | DIASTOLIC BLOOD PRESSURE: 51 MMHG

## 2018-11-05 LAB
ALBUMIN SERPL-MCNC: 2.5 G/DL (ref 3.4–5)
ALBUMIN/GLOB SERPL: 0.7 {RATIO} (ref 1–1.7)
ALP SERPL-CCNC: 59 U/L (ref 46–116)
ALT SERPL-CCNC: 51 U/L (ref 14–59)
ANION GAP SERPL CALC-SCNC: 8 MMOL/L (ref 6–14)
AST SERPL-CCNC: 24 U/L (ref 15–37)
BILIRUB SERPL-MCNC: 0.2 MG/DL (ref 0.2–1)
BUN SERPL-MCNC: 11 MG/DL (ref 7–20)
BUN/CREAT SERPL: 18 (ref 6–20)
CALCIUM SERPL-MCNC: 8.2 MG/DL (ref 8.5–10.1)
CHLORIDE SERPL-SCNC: 102 MMOL/L (ref 98–107)
CO2 SERPL-SCNC: 24 MMOL/L (ref 21–32)
CREAT SERPL-MCNC: 0.6 MG/DL (ref 0.6–1)
GFR SERPLBLD BASED ON 1.73 SQ M-ARVRAT: 109.6 ML/MIN
GLOBULIN SER-MCNC: 3.5 G/DL (ref 2.2–3.8)
GLUCOSE SERPL-MCNC: 332 MG/DL (ref 70–99)
POTASSIUM SERPL-SCNC: 4.2 MMOL/L (ref 3.5–5.1)
PROT SERPL-MCNC: 6 G/DL (ref 6.4–8.2)
PROTHROMBIN TIME: 62.5 SEC (ref 11.7–14)
PROTHROMBIN TIME: 64.6 SEC (ref 11.7–14)
SODIUM SERPL-SCNC: 134 MMOL/L (ref 136–145)

## 2018-11-05 RX ADMIN — INSULIN LISPRO SCH UNITS: 100 INJECTION, SOLUTION INTRAVENOUS; SUBCUTANEOUS at 17:20

## 2018-11-05 RX ADMIN — MORPHINE SULFATE PRN MG: 4 INJECTION, SOLUTION INTRAMUSCULAR; INTRAVENOUS at 14:48

## 2018-11-05 RX ADMIN — CIPROFLOXACIN SCH MLS/HR: 2 INJECTION INTRAVENOUS at 08:14

## 2018-11-05 RX ADMIN — SERTRALINE HYDROCHLORIDE SCH MG: 50 TABLET ORAL at 08:16

## 2018-11-05 RX ADMIN — KETOROLAC TROMETHAMINE PRN MG: 15 INJECTION, SOLUTION INTRAMUSCULAR; INTRAVENOUS at 00:33

## 2018-11-05 RX ADMIN — CIPROFLOXACIN SCH MLS/HR: 2 INJECTION INTRAVENOUS at 21:10

## 2018-11-05 RX ADMIN — MORPHINE SULFATE PRN MG: 4 INJECTION, SOLUTION INTRAMUSCULAR; INTRAVENOUS at 06:13

## 2018-11-05 RX ADMIN — KETOROLAC TROMETHAMINE PRN MG: 15 INJECTION, SOLUTION INTRAMUSCULAR; INTRAVENOUS at 08:28

## 2018-11-05 RX ADMIN — INSULIN LISPRO SCH UNITS: 100 INJECTION, SOLUTION INTRAVENOUS; SUBCUTANEOUS at 12:42

## 2018-11-05 RX ADMIN — PREGABALIN SCH MG: 50 CAPSULE ORAL at 08:14

## 2018-11-05 RX ADMIN — Medication SCH CAP: at 20:04

## 2018-11-05 RX ADMIN — PREGABALIN SCH MG: 50 CAPSULE ORAL at 20:05

## 2018-11-05 RX ADMIN — INSULIN LISPRO SCH UNITS: 100 INJECTION, SOLUTION INTRAVENOUS; SUBCUTANEOUS at 08:24

## 2018-11-05 RX ADMIN — KETOROLAC TROMETHAMINE PRN MG: 15 INJECTION, SOLUTION INTRAMUSCULAR; INTRAVENOUS at 14:48

## 2018-11-05 RX ADMIN — INSULIN LISPRO SCH UNITS: 100 INJECTION, SOLUTION INTRAVENOUS; SUBCUTANEOUS at 12:02

## 2018-11-05 RX ADMIN — ONDANSETRON PRN MG: 2 INJECTION INTRAMUSCULAR; INTRAVENOUS at 23:07

## 2018-11-05 RX ADMIN — LOSARTAN POTASSIUM SCH MG: 50 TABLET ORAL at 08:14

## 2018-11-05 RX ADMIN — ATORVASTATIN CALCIUM SCH MG: 40 TABLET, FILM COATED ORAL at 20:04

## 2018-11-05 RX ADMIN — MORPHINE SULFATE PRN MG: 4 INJECTION, SOLUTION INTRAMUSCULAR; INTRAVENOUS at 19:52

## 2018-11-05 RX ADMIN — LISINOPRIL SCH MG: 10 TABLET ORAL at 08:15

## 2018-11-05 RX ADMIN — MORPHINE SULFATE PRN MG: 4 INJECTION, SOLUTION INTRAMUSCULAR; INTRAVENOUS at 08:27

## 2018-11-05 RX ADMIN — MORPHINE SULFATE PRN MG: 4 INJECTION, SOLUTION INTRAMUSCULAR; INTRAVENOUS at 11:57

## 2018-11-05 RX ADMIN — MORPHINE SULFATE PRN MG: 4 INJECTION, SOLUTION INTRAMUSCULAR; INTRAVENOUS at 23:06

## 2018-11-05 RX ADMIN — MORPHINE SULFATE PRN MG: 4 INJECTION, SOLUTION INTRAMUSCULAR; INTRAVENOUS at 17:31

## 2018-11-05 NOTE — PDOC
PROGRESS NOTES


Chief Complaint


Chief Complaint


Abdominal Pain with possible colitis


Acute blood loss -  LGIB, hb stable


Migraines


DM - with A1c of 12.6


FV leiden


Supratherapeutic INR on warfarin


Fall


Transaminitis


h/o dvt and PEs





plan:


fu with GI


on cipro, flagyl


regular diet


monitor hb ,INR


2FFP today


increase lantus to 30u qhs, aspart 10u tid, ssi





History of Present Illness


History of Present Illness


44 yo F w/ PMHx DM, migraines, factor V Leiden deficiency (multiple PE, DVT on 

lifelong anticoagulation with coumadin) p/w abdominal pain. Onset 2 days ago, 

associated with blood in stool


Pain is cramping, colicky diffuse bilateral upper quadrant radiates to the back 

also some pain with moving her left hip. 





Her INR was 7.8  this morning and had positive occult blood in stool. Still 

with pain, feels it is somewhat improved.  


has abd pain since fall last Thursday





Vitals


Vitals





Vital Signs








  Date Time  Temp Pulse Resp B/P (MAP) Pulse Ox O2 Delivery O2 Flow Rate FiO2


 


11/5/18 15:20     100 Room Air  


 


11/5/18 15:00 97.5 70 16 119/63 (81)    





 97.5       











Physical Exam


General:  Alert, Oriented X3, Cooperative, No acute distress


Abdomen:  Normal bowel sounds, Soft, Other (Tenderness in bilateral upper 

quadrants, epigastrium and LLQ)


Extremities:  No clubbing, No cyanosis, No edema, Normal pulses, No tenderness/

swelling


Skin:  No rashes, No breakdown, No significant lesion





Labs


LABS





Laboratory Tests








Test


 11/4/18


16:39 11/4/18


20:43 11/4/18


22:31 11/5/18


00:38


 


Glucose (Fingerstick)


 342 mg/dL


(70-99) 395 mg/dL


(70-99) 405 mg/dL


(70-99) 327 mg/dL


(70-99)


 


Test


 11/5/18


06:10 11/5/18


07:56 11/5/18


11:05 11/5/18


11:50


 


Prothrombin Time


 64.6 SEC


(11.7-14.0) 


 


 62.5 SEC


(11.7-14.0)


 


Prothromb Time International


Ratio 7.8 (0.8-1.1) 


 


 


 7.4 (0.8-1.1) 





 


Sodium Level


 134 mmol/L


(136-145) 


 


 





 


Potassium Level


 4.2 mmol/L


(3.5-5.1) 


 


 





 


Chloride Level


 102 mmol/L


() 


 


 





 


Carbon Dioxide Level


 24 mmol/L


(21-32) 


 


 





 


Anion Gap 8 (6-14)    


 


Blood Urea Nitrogen


 11 mg/dL


(7-20) 


 


 





 


Creatinine


 0.6 mg/dL


(0.6-1.0) 


 


 





 


Estimated GFR


(Cockcroft-Gault) 109.6 


 


 


 





 


BUN/Creatinine Ratio 18 (6-20)    


 


Glucose Level


 332 mg/dL


(70-99) 


 


 





 


Calcium Level


 8.2 mg/dL


(8.5-10.1) 


 


 





 


Total Bilirubin


 0.2 mg/dL


(0.2-1.0) 


 


 





 


Aspartate Amino Transf


(AST/SGOT) 24 U/L (15-37) 


 


 


 





 


Alanine Aminotransferase


(ALT/SGPT) 51 U/L (14-59) 


 


 


 





 


Alkaline Phosphatase


 59 U/L


() 


 


 





 


Total Protein


 6.0 g/dL


(6.4-8.2) 


 


 





 


Albumin


 2.5 g/dL


(3.4-5.0) 


 


 





 


Albumin/Globulin Ratio 0.7 (1.0-1.7)    


 


Glucose (Fingerstick)


 


 293 mg/dL


(70-99) 333 mg/dL


(70-99) 














Assessment and Plan


Assessmemt and Plan


Problems


Medical Problems:


(1) Coumadin toxicity


Status: Acute  











Comment


Review of Relevant


I have reviewed the following items tucker (where applicable) has been applied.


Labs





Laboratory Tests








Test


 11/3/18


19:57 11/3/18


20:08 11/3/18


20:18 11/3/18


21:08


 


Urine Collection Type Unknown    


 


Urine Color Yellow    


 


Urine Clarity Clear    


 


Urine pH 5.0    


 


Urine Specific Gravity >=1.030    


 


Urine Protein


 Negative mg/dL


(NEG-TRACE) 


 


 





 


Urine Glucose (UA)


 >=1000 mg/dL


(NEG) 


 


 





 


Urine Ketones (Stick)


 Negative mg/dL


(NEG) 


 


 





 


Urine Blood Negative (NEG)    


 


Urine Nitrite Negative (NEG)    


 


Urine Bilirubin Negative (NEG)    


 


Urine Urobilinogen Dipstick


 0.2 mg/dL (0.2


mg/dL) 


 


 





 


Urine Leukocyte Esterase Negative (NEG)    


 


Urine RBC 0 /HPF (0-2)    


 


Urine WBC 0 /HPF (0-4)    


 


Urine Squamous Epithelial


Cells Mod /LPF 


 


 


 





 


Urine Bacteria


 Few /HPF


(0-FEW) 


 


 





 


Bedside Urine HCG, Qualitative


 


 Hcg negative


(Negative) 


 





 


White Blood Count


 


 


 5.5 x10^3/uL


(4.0-11.0) 





 


Red Blood Count


 


 


 4.78 x10^6/uL


(3.50-5.40) 





 


Hemoglobin


 


 


 14.5 g/dL


(12.0-15.5) 





 


Hematocrit


 


 


 40.2 %


(36.0-47.0) 





 


Mean Corpuscular Volume   84 fL ()  


 


Mean Corpuscular Hemoglobin   30 pg (25-35)  


 


Mean Corpuscular Hemoglobin


Concent 


 


 36 g/dL


(31-37) 





 


Red Cell Distribution Width


 


 


 13.3 %


(11.5-14.5) 





 


Platelet Count


 


 


 219 x10^3/uL


(140-400) 





 


Neutrophils (%) (Auto)   60 % (31-73)  


 


Lymphocytes (%) (Auto)   31 % (24-48)  


 


Monocytes (%) (Auto)   7 % (0-9)  


 


Eosinophils (%) (Auto)   1 % (0-3)  


 


Basophils (%) (Auto)   1 % (0-3)  


 


Neutrophils # (Auto)


 


 


 3.3 x10^3uL


(1.8-7.7) 





 


Lymphocytes # (Auto)


 


 


 1.7 x10^3/uL


(1.0-4.8) 





 


Monocytes # (Auto)


 


 


 0.4 x10^3/uL


(0.0-1.1) 





 


Eosinophils # (Auto)


 


 


 0.1 x10^3/uL


(0.0-0.7) 





 


Basophils # (Auto)


 


 


 0.1 x10^3/uL


(0.0-0.2) 





 


Prothrombin Time


 


 


 48.9 SEC


(11.7-14.0) 





 


Prothromb Time International


Ratio 


 


 5.5 (0.8-1.1) 


 





 


Sodium Level


 


 


 135 mmol/L


(136-145) 





 


Potassium Level


 


 


 4.0 mmol/L


(3.5-5.1) 





 


Chloride Level


 


 


 99 mmol/L


() 





 


Carbon Dioxide Level


 


 


 25 mmol/L


(21-32) 





 


Anion Gap   11 (6-14)  


 


Blood Urea Nitrogen   8 mg/dL (7-20)  


 


Creatinine


 


 


 0.8 mg/dL


(0.6-1.0) 





 


Estimated GFR


(Cockcroft-Gault) 


 


 78.7 


 





 


BUN/Creatinine Ratio   10 (6-20)  


 


Glucose Level


 


 


 426 mg/dL


(70-99) 





 


Calcium Level


 


 


 9.4 mg/dL


(8.5-10.1) 





 


Total Bilirubin


 


 


 0.2 mg/dL


(0.2-1.0) 





 


Aspartate Amino Transf


(AST/SGOT) 


 


 28 U/L (15-37) 


 





 


Alanine Aminotransferase


(ALT/SGPT) 


 


 63 U/L (14-59) 


 





 


Alkaline Phosphatase


 


 


 81 U/L


() 





 


Total Protein


 


 


 7.7 g/dL


(6.4-8.2) 





 


Albumin


 


 


 3.3 g/dL


(3.4-5.0) 





 


Albumin/Globulin Ratio   0.8 (1.0-1.7)  


 


Lipase


 


 


 183 U/L


() 





 


Stool Occult Blood    Positive (NEG) 


 


Test


 11/4/18


06:45 11/4/18


07:47 11/4/18


11:22 11/4/18


16:39


 


Prothrombin Time


 54.1 SEC


(11.7-14.0) 


 


 





 


Prothromb Time International


Ratio 6.2 (0.8-1.1) 


 


 


 





 


Sodium Level


 136 mmol/L


(136-145) 


 


 





 


Potassium Level


 4.0 mmol/L


(3.5-5.1) 


 


 





 


Chloride Level


 102 mmol/L


() 


 


 





 


Carbon Dioxide Level


 26 mmol/L


(21-32) 


 


 





 


Anion Gap 8 (6-14)    


 


Blood Urea Nitrogen 9 mg/dL (7-20)    


 


Creatinine


 0.6 mg/dL


(0.6-1.0) 


 


 





 


Estimated GFR


(Cockcroft-Gault) 109.6 


 


 


 





 


BUN/Creatinine Ratio 15 (6-20)    


 


Glucose Level


 360 mg/dL


(70-99) 


 


 





 


Calcium Level


 8.6 mg/dL


(8.5-10.1) 


 


 





 


Total Bilirubin


 0.3 mg/dL


(0.2-1.0) 


 


 





 


Aspartate Amino Transf


(AST/SGOT) 35 U/L (15-37) 


 


 


 





 


Alanine Aminotransferase


(ALT/SGPT) 61 U/L (14-59) 


 


 


 





 


Alkaline Phosphatase


 72 U/L


() 


 


 





 


Total Protein


 6.5 g/dL


(6.4-8.2) 


 


 





 


Albumin


 2.9 g/dL


(3.4-5.0) 


 


 





 


Albumin/Globulin Ratio 0.8 (1.0-1.7)    


 


Thyroid Stimulating Hormone


(TSH) 4.701 uIU/mL


(0.358-3.74) 


 


 





 


Glucose (Fingerstick)


 


 437 mg/dL


(70-99) 464 mg/dL


(70-99) 342 mg/dL


(70-99)


 


Test


 11/4/18


20:43 11/4/18


22:31 11/5/18


00:38 11/5/18


06:10


 


Glucose (Fingerstick)


 395 mg/dL


(70-99) 405 mg/dL


(70-99) 327 mg/dL


(70-99) 





 


Prothrombin Time


 


 


 


 64.6 SEC


(11.7-14.0)


 


Prothromb Time International


Ratio 


 


 


 7.8 (0.8-1.1) 





 


Sodium Level


 


 


 


 134 mmol/L


(136-145)


 


Potassium Level


 


 


 


 4.2 mmol/L


(3.5-5.1)


 


Chloride Level


 


 


 


 102 mmol/L


()


 


Carbon Dioxide Level


 


 


 


 24 mmol/L


(21-32)


 


Anion Gap    8 (6-14) 


 


Blood Urea Nitrogen


 


 


 


 11 mg/dL


(7-20)


 


Creatinine


 


 


 


 0.6 mg/dL


(0.6-1.0)


 


Estimated GFR


(Cockcroft-Gault) 


 


 


 109.6 





 


BUN/Creatinine Ratio    18 (6-20) 


 


Glucose Level


 


 


 


 332 mg/dL


(70-99)


 


Calcium Level


 


 


 


 8.2 mg/dL


(8.5-10.1)


 


Total Bilirubin


 


 


 


 0.2 mg/dL


(0.2-1.0)


 


Aspartate Amino Transf


(AST/SGOT) 


 


 


 24 U/L (15-37) 





 


Alanine Aminotransferase


(ALT/SGPT) 


 


 


 51 U/L (14-59) 





 


Alkaline Phosphatase


 


 


 


 59 U/L


()


 


Total Protein


 


 


 


 6.0 g/dL


(6.4-8.2)


 


Albumin


 


 


 


 2.5 g/dL


(3.4-5.0)


 


Albumin/Globulin Ratio    0.7 (1.0-1.7) 


 


Test


 11/5/18


07:56 11/5/18


11:05 11/5/18


11:50 





 


Glucose (Fingerstick)


 293 mg/dL


(70-99) 333 mg/dL


(70-99) 


 





 


Prothrombin Time


 


 


 62.5 SEC


(11.7-14.0) 





 


Prothromb Time International


Ratio 


 


 7.4 (0.8-1.1) 


 











Laboratory Tests








Test


 11/4/18


16:39 11/4/18


20:43 11/4/18


22:31 11/5/18


00:38


 


Glucose (Fingerstick)


 342 mg/dL


(70-99) 395 mg/dL


(70-99) 405 mg/dL


(70-99) 327 mg/dL


(70-99)


 


Test


 11/5/18


06:10 11/5/18


07:56 11/5/18


11:05 11/5/18


11:50


 


Prothrombin Time


 64.6 SEC


(11.7-14.0) 


 


 62.5 SEC


(11.7-14.0)


 


Prothromb Time International


Ratio 7.8 (0.8-1.1) 


 


 


 7.4 (0.8-1.1) 





 


Sodium Level


 134 mmol/L


(136-145) 


 


 





 


Potassium Level


 4.2 mmol/L


(3.5-5.1) 


 


 





 


Chloride Level


 102 mmol/L


() 


 


 





 


Carbon Dioxide Level


 24 mmol/L


(21-32) 


 


 





 


Anion Gap 8 (6-14)    


 


Blood Urea Nitrogen


 11 mg/dL


(7-20) 


 


 





 


Creatinine


 0.6 mg/dL


(0.6-1.0) 


 


 





 


Estimated GFR


(Cockcroft-Gault) 109.6 


 


 


 





 


BUN/Creatinine Ratio 18 (6-20)    


 


Glucose Level


 332 mg/dL


(70-99) 


 


 





 


Calcium Level


 8.2 mg/dL


(8.5-10.1) 


 


 





 


Total Bilirubin


 0.2 mg/dL


(0.2-1.0) 


 


 





 


Aspartate Amino Transf


(AST/SGOT) 24 U/L (15-37) 


 


 


 





 


Alanine Aminotransferase


(ALT/SGPT) 51 U/L (14-59) 


 


 


 





 


Alkaline Phosphatase


 59 U/L


() 


 


 





 


Total Protein


 6.0 g/dL


(6.4-8.2) 


 


 





 


Albumin


 2.5 g/dL


(3.4-5.0) 


 


 





 


Albumin/Globulin Ratio 0.7 (1.0-1.7)    


 


Glucose (Fingerstick)


 


 293 mg/dL


(70-99) 333 mg/dL


(70-99) 











Medications





Current Medications


Fentanyl Citrate (Fentanyl 2ml Vial) 50 mcg 1X  ONCE IV  Last administered on 11

/3/18at 20:24;  Start 11/3/18 at 20:15;  Stop 11/3/18 at 20:16;  Status DC


Iohexol (Omnipaque 300 Mg/ml) 100 ml STK-MED ONCE .ROUTE ;  Start 11/3/18 at 21:

08;  Stop 11/3/18 at 21:09;  Status DC


Metronidazole 100 ml @  100 mls/hr 1X  ONCE IV  Last administered on 11/3/18at 

23:04;  Start 11/3/18 at 22:30;  Stop 11/3/18 at 23:29;  Status DC


Aztreonam 1 gm/ Dextrose 50 ml @  100 mls/hr Q6HRS IV ;  Start 11/4/18 at 06:00

;  Stop 11/4/18 at 06:00;  Status DC


Sodium Chloride 1,000 ml @  1,000 mls/hr 1X  ONCE IV  Last administered on 11/3/

18at 23:04;  Start 11/3/18 at 22:30;  Stop 11/3/18 at 23:29;  Status DC


Phytonadione (Mephyton Oral Soln) 2.5 mg 1X  ONCE PO  Last administered on 11/3/

18at 23:05;  Start 11/3/18 at 22:45;  Stop 11/3/18 at 22:46;  Status DC


Morphine Sulfate (Morphine Sulfate) 4 mg PRN Q2HR  PRN IV PAIN Last 

administered on 11/4/18at 20:41;  Start 11/3/18 at 22:45;  Stop 11/4/18 at 22:44

;  Status DC


Sodium Chloride 1,000 ml @  100 mls/hr Q10H IV  Last administered on 11/4/18at 

17:18;  Start 11/3/18 at 22:45;  Stop 11/4/18 at 22:44;  Status DC


Aztreonam 1 gm/ Dextrose 50 ml @  100 mls/hr ONCE  ONCE IV  Last administered 

on 11/4/18at 00:11;  Start 11/3/18 at 23:00;  Stop 11/3/18 at 23:29;  Status DC


Ondansetron HCl (Zofran) 4 mg PRN Q6HRS  PRN IV NAUSEA/VOMITING;  Start 11/4/18 

at 01:00


Prochlorperazine Edisylate (Compazine) 10 mg PRN Q6HRS  PRN IV NAUSEA/VOMITING;

  Start 11/4/18 at 01:00


Oxycodone HCl (Roxicodone) 5 mg PRN Q3HRS  PRN PO BREAKTHROUGH PAIN Last 

administered on 11/5/18at 00:33;  Start 11/4/18 at 01:00


Ketorolac Tromethamine (Toradol 15mg Vial) 15 mg PRN Q6HRS  PRN IV MILD PAIN 

Last administered on 11/5/18at 14:48;  Start 11/4/18 at 01:00;  Stop 11/9/18 at 

00:59


Lactulose (Lactulose) 20 gm PRN Q12HR  PRN PO CONSTIPATION;  Start 11/4/18 at 01

:00


Insulin Human Lispro (HumaLOG) 0-9 UNITS TIDWMEALS SQ  Last administered on 11/5 /18at 12:02;  Start 11/4/18 at 08:00


Dextrose (Dextrose 50%-Water Syringe) 12.5 gm PRN Q15MIN  PRN IV SEE COMMENTS;  

Start 11/4/18 at 01:00


Clonazepam (KlonoPIN) 0.5 mg DAILY PO  Last administered on 11/4/18at 09:11;  

Start 11/4/18 at 09:00;  Stop 11/5/18 at 08:12;  Status DC


Insulin Glargine (Lantus) 12 units QHS SQ  Last administered on 11/4/18at 20:48

;  Start 11/4/18 at 01:15;  Stop 11/5/18 at 12:02;  Status DC


Pregabalin (Lyrica) 50 mg BID PO  Last administered on 11/5/18at 08:14;  Start 

11/4/18 at 09:00


Sertraline HCl (Zoloft) 50 mg DAILY PO  Last administered on 11/5/18at 08:16;  

Start 11/4/18 at 09:00


Losartan Potassium (Cozaar) 100 mg DAILY PO  Last administered on 11/5/18at 08:

14;  Start 11/4/18 at 09:00


Non-Formulary Medication (Quinapril/ Hydrochlorothiazide (Quinapril-Hctz 10-

12.5 Mg Tab)) 1 each DAILY PO ;  Start 11/4/18 at 09:00;  Status UNV


Atorvastatin Calcium (Lipitor) 40 mg HS PO  Last administered on 11/4/18at 20:41

;  Start 11/4/18 at 21:00


Sumatriptan Succinate (Imitrex) 100 mg PRN Q2HR  PRN PO MIGRAINE HEADACHE;  

Start 11/4/18 at 01:00


Metronidazole 100 ml @  100 mls/hr Q8HRS IV  Last administered on 11/5/18at 14:

30;  Start 11/4/18 at 06:00


Ciprofloxacin/ Dextrose 100 ml @  100 mls/hr Q12HR IV  Last administered on 11/5 /18at 08:14;  Start 11/4/18 at 01:30


Lisinopril (Prinivil) 10 mg DAILY PO  Last administered on 11/5/18at 08:15;  

Start 11/4/18 at 09:00


Hydrochlorothiazide (Microzide) 12.5 mg DAILY PO  Last administered on 11/5/ 18at 08:15;  Start 11/4/18 at 09:00


Iohexol (Omnipaque 300 Mg/ml) 100 ml STK-MED ONCE .ROUTE ;  Start 11/4/18 at 03:

45;  Stop 11/4/18 at 03:46;  Status DC


Insulin Glargine (Lantus) 12 units 1X  ONCE SQ  Last administered on 11/4/18at 

09:17;  Start 11/4/18 at 09:00;  Stop 11/4/18 at 09:01;  Status DC


Insulin Human Lispro (HumaLOG) 12 units 1X  ONCE SQ  Last administered on 11/4/ 18at 09:17;  Start 11/4/18 at 09:00;  Stop 11/4/18 at 09:01;  Status DC


Insulin Human Lispro (HumaLOG) 8 units 1X  ONCE SQ  Last administered on 11/4/ 18at 23:23;  Start 11/4/18 at 23:00;  Stop 11/4/18 at 23:03;  Status DC


Morphine Sulfate (Morphine Sulfate) 4 mg PRN Q2HR  PRN IV MODERATE - SEVERE 

PAIN Last administered on 11/5/18at 14:48;  Start 11/5/18 at 01:00


Clonazepam (KlonoPIN) 0.5 mg PRN DAILY  PRN PO ANXIETY / AGITATION;  Start 11/5/ 18 at 08:15


Lactobacillus Rhamnosus (Culturelle) 1 cap BID PO ;  Start 11/5/18 at 21:00


Insulin Glargine (Lantus) 30 units QHS SQ ;  Start 11/5/18 at 21:00


Insulin Human Lispro (HumaLOG) 10 units TIDAC SQ  Last administered on 11/5/ 18at 12:42;  Start 11/5/18 at 12:00


Influenza Virus Vaccine (Afluria Trivalent [id number] Syringe) 0.5 ml ONCE ONCE 

VAX IM ;  Start 11/6/18 at 09:00;  Stop 11/6/18 at 09:01


Dicyclomine HCl (Bentyl) 10 mg PRN QID  PRN PO abd pain;  Start 11/5/18 at 14:30





Active Scripts


Active


Reported


Atacand (Candesartan Cilexetil) 32 Mg Tablet 1 Tab PO DAILY


Quinapril-Hctz 10-12.5 Mg Tab (Quinapril/Hydrochlorothiazide) 1 Each Tablet 1 

Each PO DAILY


Relpax (Eletriptan Hbr) 20 Mg Tablet 20 Mg PO 


Reglan (Metoclopramide Hcl) 10 Mg Tablet 10 Mg PO TIDWMEALHC


Lyrica (Pregabalin) 50 Mg Capsule 50 Mg PO BID


Clonazepam 0.5 Mg Tablet 1 Tab PO PRN DAILY PRN


Zoloft (Sertraline Hcl) 50 Mg Tablet 1 Tab PO DAILY


Crestor (Rosuvastatin Calcium) 10 Mg Tablet 1 Tab PO DAILY


Lantus Solostar (Insulin Glargine,Hum.rec.anlog) 100 Unit/1 Ml Insuln.pen 66 

Unit SQ QHS


Vitals/I & O





Vital Sign - Last 24 Hours








 11/4/18 11/4/18 11/4/18 11/4/18





 16:50 19:05 20:41 21:11


 


Temp  98.0  





  98.0  


 


Pulse  68  


 


Resp  18 20 20


 


B/P (MAP)  122/56 (78)  


 


Pulse Ox  95 95 95


 


O2 Delivery Room Air Room Air Room Air Room Air


 


    





    





 11/4/18 11/5/18 11/5/18 11/5/18





 23:12 00:33 01:33 03:10


 


Temp 98.5   98.0





 98.5   98.0


 


Pulse 63   59


 


Resp 18 20 20 18


 


B/P (MAP) 107/64 (78)   101/51 (68)


 


Pulse Ox 96 96 96 95


 


O2 Delivery Room Air Room Air Room Air Room Air


 


    





    





 11/5/18 11/5/18 11/5/18 11/5/18





 06:13 06:43 07:00 08:14


 


Temp   97.7 





   97.7 


 


Pulse   63 63


 


Resp 20 20 16 


 


B/P (MAP)   125/77 (93) 125/77


 


Pulse Ox 95  98 


 


O2 Delivery Room Air  Room Air 


 


    





    





 11/5/18 11/5/18 11/5/18 11/5/18





 08:15 08:15 08:27 11:00


 


Temp    98.1





    98.1


 


Pulse  63  67


 


Resp    16


 


B/P (MAP)  125/77  110/72 (85)


 


Pulse Ox   95 94


 


O2 Delivery Room Air  Room Air Room Air


 


    





    





 11/5/18 11/5/18 11/5/18 11/5/18





 11:57 14:48 15:00 15:20


 


Temp   97.5 





   97.5 


 


Pulse   70 


 


Resp   16 


 


B/P (MAP)   119/63 (81) 


 


Pulse Ox 94 94 100 100


 


O2 Delivery Room Air Room Air Room Air Room Air














Intake and Output   


 


 11/4/18 11/4/18 11/5/18





 15:00 23:00 07:00


 


Intake Total  200 ml 400 ml


 


Balance  200 ml 400 ml

















TERESA LESLIE MD Nov 5, 2018 15:47

## 2018-11-05 NOTE — PDOC
Subjective:


Subjective:


Tolerating regular diet w/o increase in pain.


Pain across lower abdomen - better with legs drawn to chest.


No n/v or diarrhea.  No bleeding.





Objective:


Vital Signs:





 Vital Signs








  Date Time  Temp Pulse Resp B/P (MAP) Pulse Ox O2 Delivery O2 Flow Rate FiO2


 


11/5/18 12:43     94 Room Air  


 


11/5/18 11:00 98.1 67 16 110/72 (85)    





 98.1       








Labs:





Laboratory Tests








Test


 11/4/18


16:39 11/4/18


20:43 11/4/18


22:31 11/5/18


00:38


 


Glucose (Fingerstick) 342 mg/dL  395 mg/dL  405 mg/dL  327 mg/dL 


 


Test


 11/5/18


06:10 11/5/18


07:56 11/5/18


11:05 11/5/18


11:50


 


Prothrombin Time 64.6 SEC    62.5 SEC 


 


Prothromb Time International


Ratio 7.8 


 


 


 7.4 





 


Sodium Level 134 mmol/L    


 


Potassium Level 4.2 mmol/L    


 


Chloride Level 102 mmol/L    


 


Carbon Dioxide Level 24 mmol/L    


 


Anion Gap 8    


 


Blood Urea Nitrogen 11 mg/dL    


 


Creatinine 0.6 mg/dL    


 


Estimated GFR


(Cockcroft-Gault) 109.6 


 


 


 





 


BUN/Creatinine Ratio 18    


 


Glucose Level 332 mg/dL    


 


Calcium Level 8.2 mg/dL    


 


Total Bilirubin 0.2 mg/dL    


 


Aspartate Amino Transf


(AST/SGOT) 24 U/L 


 


 


 





 


Alanine Aminotransferase


(ALT/SGPT) 51 U/L 


 


 


 





 


Alkaline Phosphatase 59 U/L    


 


Total Protein 6.0 g/dL    


 


Albumin 2.5 g/dL    


 


Albumin/Globulin Ratio 0.7    


 


Glucose (Fingerstick)  293 mg/dL  333 mg/dL  











PE:





GEN: NAD, talking on cell


LUNGS: CTAB


HEART: RRR


ABD: vague BLQ and suprapubic tenderness, obese


NEURO/PSYCH: A & O 3





A/P:


Factor V Leiden, Coumadin coagulopathy


DM


Lower abd pain - bilateral


Abnormal CT - "segmental wall thickening of the descending colon and sigmoid 

colon... may be due to incomplete distention"





--


Doubt colitis - no diarrhea or bleeding.  ?need for IV atbx


Coagulopathy worse.


Try dicyclomine for abd pain.











CORY GARNER Nov 5, 2018 14:20

## 2018-11-06 VITALS — DIASTOLIC BLOOD PRESSURE: 72 MMHG | SYSTOLIC BLOOD PRESSURE: 119 MMHG

## 2018-11-06 VITALS — SYSTOLIC BLOOD PRESSURE: 103 MMHG | DIASTOLIC BLOOD PRESSURE: 58 MMHG

## 2018-11-06 VITALS — SYSTOLIC BLOOD PRESSURE: 111 MMHG | DIASTOLIC BLOOD PRESSURE: 58 MMHG

## 2018-11-06 VITALS — SYSTOLIC BLOOD PRESSURE: 141 MMHG | DIASTOLIC BLOOD PRESSURE: 73 MMHG

## 2018-11-06 VITALS — SYSTOLIC BLOOD PRESSURE: 118 MMHG | DIASTOLIC BLOOD PRESSURE: 66 MMHG

## 2018-11-06 VITALS — SYSTOLIC BLOOD PRESSURE: 109 MMHG | DIASTOLIC BLOOD PRESSURE: 57 MMHG

## 2018-11-06 LAB
ALBUMIN SERPL-MCNC: 2.7 G/DL (ref 3.4–5)
ALBUMIN/GLOB SERPL: 0.7 {RATIO} (ref 1–1.7)
ALP SERPL-CCNC: 88 U/L (ref 46–116)
ALT SERPL-CCNC: 69 U/L (ref 14–59)
ANION GAP SERPL CALC-SCNC: 7 MMOL/L (ref 6–14)
AST SERPL-CCNC: 38 U/L (ref 15–37)
BASOPHILS # BLD AUTO: 0.1 X10^3/UL (ref 0–0.2)
BASOPHILS NFR BLD: 1 % (ref 0–3)
BILIRUB SERPL-MCNC: 0.3 MG/DL (ref 0.2–1)
BUN SERPL-MCNC: 9 MG/DL (ref 7–20)
BUN/CREAT SERPL: 13 (ref 6–20)
CALCIUM SERPL-MCNC: 8.6 MG/DL (ref 8.5–10.1)
CHLORIDE SERPL-SCNC: 102 MMOL/L (ref 98–107)
CO2 SERPL-SCNC: 28 MMOL/L (ref 21–32)
CREAT SERPL-MCNC: 0.7 MG/DL (ref 0.6–1)
EOSINOPHIL NFR BLD: 0.1 X10^3/UL (ref 0–0.7)
EOSINOPHIL NFR BLD: 2 % (ref 0–3)
ERYTHROCYTE [DISTWIDTH] IN BLOOD BY AUTOMATED COUNT: 13.5 % (ref 11.5–14.5)
GFR SERPLBLD BASED ON 1.73 SQ M-ARVRAT: 91.8 ML/MIN
GLOBULIN SER-MCNC: 3.7 G/DL (ref 2.2–3.8)
GLUCOSE SERPL-MCNC: 343 MG/DL (ref 70–99)
HCT VFR BLD CALC: 36.7 % (ref 36–47)
HGB BLD-MCNC: 12.7 G/DL (ref 12–15.5)
LYMPHOCYTES # BLD: 1.2 X10^3/UL (ref 1–4.8)
LYMPHOCYTES NFR BLD AUTO: 23 % (ref 24–48)
MCH RBC QN AUTO: 29 PG (ref 25–35)
MCHC RBC AUTO-ENTMCNC: 35 G/DL (ref 31–37)
MCV RBC AUTO: 85 FL (ref 79–100)
MONO #: 0.4 X10^3/UL (ref 0–1.1)
MONOCYTES NFR BLD: 8 % (ref 0–9)
NEUT #: 3.5 X10^3UL (ref 1.8–7.7)
NEUTROPHILS NFR BLD AUTO: 67 % (ref 31–73)
PLATELET # BLD AUTO: 189 X10^3/UL (ref 140–400)
POTASSIUM SERPL-SCNC: 4.5 MMOL/L (ref 3.5–5.1)
PROT SERPL-MCNC: 6.4 G/DL (ref 6.4–8.2)
PROTHROMBIN TIME: 37.6 SEC (ref 11.7–14)
RBC # BLD AUTO: 4.3 X10^6/UL (ref 3.5–5.4)
SODIUM SERPL-SCNC: 137 MMOL/L (ref 136–145)
WBC # BLD AUTO: 5.3 X10^3/UL (ref 4–11)

## 2018-11-06 RX ADMIN — MORPHINE SULFATE PRN MG: 4 INJECTION, SOLUTION INTRAMUSCULAR; INTRAVENOUS at 18:40

## 2018-11-06 RX ADMIN — PREGABALIN SCH MG: 50 CAPSULE ORAL at 21:34

## 2018-11-06 RX ADMIN — INSULIN LISPRO SCH UNITS: 100 INJECTION, SOLUTION INTRAVENOUS; SUBCUTANEOUS at 16:57

## 2018-11-06 RX ADMIN — CIPROFLOXACIN SCH MLS/HR: 2 INJECTION INTRAVENOUS at 21:33

## 2018-11-06 RX ADMIN — INSULIN LISPRO SCH UNITS: 100 INJECTION, SOLUTION INTRAVENOUS; SUBCUTANEOUS at 08:15

## 2018-11-06 RX ADMIN — MORPHINE SULFATE PRN MG: 4 INJECTION, SOLUTION INTRAMUSCULAR; INTRAVENOUS at 06:05

## 2018-11-06 RX ADMIN — MORPHINE SULFATE PRN MG: 4 INJECTION, SOLUTION INTRAMUSCULAR; INTRAVENOUS at 21:34

## 2018-11-06 RX ADMIN — KETOROLAC TROMETHAMINE PRN MG: 15 INJECTION, SOLUTION INTRAMUSCULAR; INTRAVENOUS at 08:08

## 2018-11-06 RX ADMIN — INSULIN LISPRO SCH UNITS: 100 INJECTION, SOLUTION INTRAVENOUS; SUBCUTANEOUS at 12:44

## 2018-11-06 RX ADMIN — MORPHINE SULFATE PRN MG: 4 INJECTION, SOLUTION INTRAMUSCULAR; INTRAVENOUS at 09:57

## 2018-11-06 RX ADMIN — MORPHINE SULFATE PRN MG: 4 INJECTION, SOLUTION INTRAMUSCULAR; INTRAVENOUS at 03:37

## 2018-11-06 RX ADMIN — SERTRALINE HYDROCHLORIDE SCH MG: 50 TABLET ORAL at 08:12

## 2018-11-06 RX ADMIN — PREGABALIN SCH MG: 50 CAPSULE ORAL at 08:10

## 2018-11-06 RX ADMIN — CIPROFLOXACIN SCH MLS/HR: 2 INJECTION INTRAVENOUS at 09:56

## 2018-11-06 RX ADMIN — Medication SCH CAP: at 08:12

## 2018-11-06 RX ADMIN — KETOROLAC TROMETHAMINE PRN MG: 15 INJECTION, SOLUTION INTRAMUSCULAR; INTRAVENOUS at 18:39

## 2018-11-06 RX ADMIN — ATORVASTATIN CALCIUM SCH MG: 40 TABLET, FILM COATED ORAL at 21:34

## 2018-11-06 RX ADMIN — LOSARTAN POTASSIUM SCH MG: 50 TABLET ORAL at 08:14

## 2018-11-06 RX ADMIN — MORPHINE SULFATE PRN MG: 4 INJECTION, SOLUTION INTRAMUSCULAR; INTRAVENOUS at 14:58

## 2018-11-06 RX ADMIN — Medication SCH CAP: at 21:34

## 2018-11-06 NOTE — PDOC
PROGRESS NOTES


Chief Complaint


Chief Complaint


Abdominal Pain with possible colitis


Acute blood loss -  LGIB, hb stable


Migraines


DM - with A1c of 12.6


FV leiden


Supratherapeutic INR on warfarin


Fall


Transaminitis


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





plan:


fu with GI


on cipro, flagyl


regular diet switch to full liquid given cont abd pain with vomiting lastn ight


monitor hb ,INR daily, warfarin held


2FFP 11/5


increase lantus to 45u qhs, aspart 15u tid, ssi





History of Present Illness


History of Present Illness


44 yo F w/ PMHx DM, migraines, factor V Leiden deficiency (multiple PE, DVT on 

lifelong anticoagulation with coumadin) p/w abdominal pain. Onset 2 days ago, 

associated with blood in stool


Pain is cramping, colicky diffuse bilateral upper quadrant radiates to the back 

also some pain with moving her left hip. 





 


has abd pain since fall last Thursday





no bloody stool since 11/5, still abd pain 11/6, 8/10, vomited 11/5 night





INR 3.8 after 2 FFP 11/5





STILL hyperglycemia





Vitals


Vitals





Vital Signs








  Date Time  Temp Pulse Resp B/P (MAP) Pulse Ox O2 Delivery O2 Flow Rate FiO2


 


11/6/18 11:00 97.9 58 16 109/57 (74) 92 Room Air  





 97.9       











Physical Exam


General:  Alert, Oriented X3, Cooperative, No acute distress


Abdomen:  Normal bowel sounds, Soft, Other (Tenderness in bilateral upper 

quadrants, epigastrium and LLQ)


Extremities:  No clubbing, No cyanosis, No edema, Normal pulses, No tenderness/

swelling


Skin:  No rashes, No breakdown, No significant lesion





Labs


LABS





Laboratory Tests








Test


 11/5/18


17:00 11/5/18


20:10 11/6/18


02:50 11/6/18


07:49


 


Glucose (Fingerstick)


 241 mg/dL


(70-99) 291 mg/dL


(70-99) 


 266 mg/dL


(70-99)


 


White Blood Count


 


 


 5.3 x10^3/uL


(4.0-11.0) 





 


Red Blood Count


 


 


 4.30 x10^6/uL


(3.50-5.40) 





 


Hemoglobin


 


 


 12.7 g/dL


(12.0-15.5) 





 


Hematocrit


 


 


 36.7 %


(36.0-47.0) 





 


Mean Corpuscular Volume   85 fL ()  


 


Mean Corpuscular Hemoglobin   29 pg (25-35)  


 


Mean Corpuscular Hemoglobin


Concent 


 


 35 g/dL


(31-37) 





 


Red Cell Distribution Width


 


 


 13.5 %


(11.5-14.5) 





 


Platelet Count


 


 


 189 x10^3/uL


(140-400) 





 


Neutrophils (%) (Auto)   67 % (31-73)  


 


Lymphocytes (%) (Auto)   23 % (24-48)  


 


Monocytes (%) (Auto)   8 % (0-9)  


 


Eosinophils (%) (Auto)   2 % (0-3)  


 


Basophils (%) (Auto)   1 % (0-3)  


 


Neutrophils # (Auto)


 


 


 3.5 x10^3uL


(1.8-7.7) 





 


Lymphocytes # (Auto)


 


 


 1.2 x10^3/uL


(1.0-4.8) 





 


Monocytes # (Auto)


 


 


 0.4 x10^3/uL


(0.0-1.1) 





 


Eosinophils # (Auto)


 


 


 0.1 x10^3/uL


(0.0-0.7) 





 


Basophils # (Auto)


 


 


 0.1 x10^3/uL


(0.0-0.2) 





 


Prothrombin Time


 


 


 37.6 SEC


(11.7-14.0) 





 


Prothromb Time International


Ratio 


 


 3.9 (0.8-1.1) 


 





 


Sodium Level


 


 


 137 mmol/L


(136-145) 





 


Potassium Level


 


 


 4.5 mmol/L


(3.5-5.1) 





 


Chloride Level


 


 


 102 mmol/L


() 





 


Carbon Dioxide Level


 


 


 28 mmol/L


(21-32) 





 


Anion Gap   7 (6-14)  


 


Blood Urea Nitrogen   9 mg/dL (7-20)  


 


Creatinine


 


 


 0.7 mg/dL


(0.6-1.0) 





 


Estimated GFR


(Cockcroft-Gault) 


 


 91.8 


 





 


BUN/Creatinine Ratio   13 (6-20)  


 


Glucose Level


 


 


 343 mg/dL


(70-99) 





 


Calcium Level


 


 


 8.6 mg/dL


(8.5-10.1) 





 


Total Bilirubin


 


 


 0.3 mg/dL


(0.2-1.0) 





 


Aspartate Amino Transf


(AST/SGOT) 


 


 38 U/L (15-37) 


 





 


Alanine Aminotransferase


(ALT/SGPT) 


 


 69 U/L (14-59) 


 





 


Alkaline Phosphatase


 


 


 88 U/L


() 





 


Total Protein


 


 


 6.4 g/dL


(6.4-8.2) 





 


Albumin


 


 


 2.7 g/dL


(3.4-5.0) 





 


Albumin/Globulin Ratio   0.7 (1.0-1.7)  


 


Test


 11/6/18


11:40 


 


 





 


Glucose (Fingerstick)


 296 mg/dL


(70-99) 


 


 














Assessment and Plan


Assessmemt and Plan


Problems


Medical Problems:


(1) Coumadin toxicity


Status: Acute  











Comment


Review of Relevant


I have reviewed the following items tucker (where applicable) has been applied.


Labs





Laboratory Tests








Test


 11/4/18


16:39 11/4/18


20:43 11/4/18


22:31 11/5/18


00:38


 


Glucose (Fingerstick)


 342 mg/dL


(70-99) 395 mg/dL


(70-99) 405 mg/dL


(70-99) 327 mg/dL


(70-99)


 


Test


 11/5/18


06:10 11/5/18


07:56 11/5/18


11:05 11/5/18


11:50


 


Prothrombin Time


 64.6 SEC


(11.7-14.0) 


 


 62.5 SEC


(11.7-14.0)


 


Prothromb Time International


Ratio 7.8 (0.8-1.1) 


 


 


 7.4 (0.8-1.1) 





 


Sodium Level


 134 mmol/L


(136-145) 


 


 





 


Potassium Level


 4.2 mmol/L


(3.5-5.1) 


 


 





 


Chloride Level


 102 mmol/L


() 


 


 





 


Carbon Dioxide Level


 24 mmol/L


(21-32) 


 


 





 


Anion Gap 8 (6-14)    


 


Blood Urea Nitrogen


 11 mg/dL


(7-20) 


 


 





 


Creatinine


 0.6 mg/dL


(0.6-1.0) 


 


 





 


Estimated GFR


(Cockcroft-Gault) 109.6 


 


 


 





 


BUN/Creatinine Ratio 18 (6-20)    


 


Glucose Level


 332 mg/dL


(70-99) 


 


 





 


Calcium Level


 8.2 mg/dL


(8.5-10.1) 


 


 





 


Total Bilirubin


 0.2 mg/dL


(0.2-1.0) 


 


 





 


Aspartate Amino Transf


(AST/SGOT) 24 U/L (15-37) 


 


 


 





 


Alanine Aminotransferase


(ALT/SGPT) 51 U/L (14-59) 


 


 


 





 


Alkaline Phosphatase


 59 U/L


() 


 


 





 


Total Protein


 6.0 g/dL


(6.4-8.2) 


 


 





 


Albumin


 2.5 g/dL


(3.4-5.0) 


 


 





 


Albumin/Globulin Ratio 0.7 (1.0-1.7)    


 


Glucose (Fingerstick)


 


 293 mg/dL


(70-99) 333 mg/dL


(70-99) 





 


Test


 11/5/18


17:00 11/5/18


20:10 11/6/18


02:50 11/6/18


07:49


 


Glucose (Fingerstick)


 241 mg/dL


(70-99) 291 mg/dL


(70-99) 


 266 mg/dL


(70-99)


 


White Blood Count


 


 


 5.3 x10^3/uL


(4.0-11.0) 





 


Red Blood Count


 


 


 4.30 x10^6/uL


(3.50-5.40) 





 


Hemoglobin


 


 


 12.7 g/dL


(12.0-15.5) 





 


Hematocrit


 


 


 36.7 %


(36.0-47.0) 





 


Mean Corpuscular Volume   85 fL ()  


 


Mean Corpuscular Hemoglobin   29 pg (25-35)  


 


Mean Corpuscular Hemoglobin


Concent 


 


 35 g/dL


(31-37) 





 


Red Cell Distribution Width


 


 


 13.5 %


(11.5-14.5) 





 


Platelet Count


 


 


 189 x10^3/uL


(140-400) 





 


Neutrophils (%) (Auto)   67 % (31-73)  


 


Lymphocytes (%) (Auto)   23 % (24-48)  


 


Monocytes (%) (Auto)   8 % (0-9)  


 


Eosinophils (%) (Auto)   2 % (0-3)  


 


Basophils (%) (Auto)   1 % (0-3)  


 


Neutrophils # (Auto)


 


 


 3.5 x10^3uL


(1.8-7.7) 





 


Lymphocytes # (Auto)


 


 


 1.2 x10^3/uL


(1.0-4.8) 





 


Monocytes # (Auto)


 


 


 0.4 x10^3/uL


(0.0-1.1) 





 


Eosinophils # (Auto)


 


 


 0.1 x10^3/uL


(0.0-0.7) 





 


Basophils # (Auto)


 


 


 0.1 x10^3/uL


(0.0-0.2) 





 


Prothrombin Time


 


 


 37.6 SEC


(11.7-14.0) 





 


Prothromb Time International


Ratio 


 


 3.9 (0.8-1.1) 


 





 


Sodium Level


 


 


 137 mmol/L


(136-145) 





 


Potassium Level


 


 


 4.5 mmol/L


(3.5-5.1) 





 


Chloride Level


 


 


 102 mmol/L


() 





 


Carbon Dioxide Level


 


 


 28 mmol/L


(21-32) 





 


Anion Gap   7 (6-14)  


 


Blood Urea Nitrogen   9 mg/dL (7-20)  


 


Creatinine


 


 


 0.7 mg/dL


(0.6-1.0) 





 


Estimated GFR


(Cockcroft-Gault) 


 


 91.8 


 





 


BUN/Creatinine Ratio   13 (6-20)  


 


Glucose Level


 


 


 343 mg/dL


(70-99) 





 


Calcium Level


 


 


 8.6 mg/dL


(8.5-10.1) 





 


Total Bilirubin


 


 


 0.3 mg/dL


(0.2-1.0) 





 


Aspartate Amino Transf


(AST/SGOT) 


 


 38 U/L (15-37) 


 





 


Alanine Aminotransferase


(ALT/SGPT) 


 


 69 U/L (14-59) 


 





 


Alkaline Phosphatase


 


 


 88 U/L


() 





 


Total Protein


 


 


 6.4 g/dL


(6.4-8.2) 





 


Albumin


 


 


 2.7 g/dL


(3.4-5.0) 





 


Albumin/Globulin Ratio   0.7 (1.0-1.7)  


 


Test


 11/6/18


11:40 


 


 





 


Glucose (Fingerstick)


 296 mg/dL


(70-99) 


 


 











Laboratory Tests








Test


 11/5/18


17:00 11/5/18


20:10 11/6/18


02:50 11/6/18


07:49


 


Glucose (Fingerstick)


 241 mg/dL


(70-99) 291 mg/dL


(70-99) 


 266 mg/dL


(70-99)


 


White Blood Count


 


 


 5.3 x10^3/uL


(4.0-11.0) 





 


Red Blood Count


 


 


 4.30 x10^6/uL


(3.50-5.40) 





 


Hemoglobin


 


 


 12.7 g/dL


(12.0-15.5) 





 


Hematocrit


 


 


 36.7 %


(36.0-47.0) 





 


Mean Corpuscular Volume   85 fL ()  


 


Mean Corpuscular Hemoglobin   29 pg (25-35)  


 


Mean Corpuscular Hemoglobin


Concent 


 


 35 g/dL


(31-37) 





 


Red Cell Distribution Width


 


 


 13.5 %


(11.5-14.5) 





 


Platelet Count


 


 


 189 x10^3/uL


(140-400) 





 


Neutrophils (%) (Auto)   67 % (31-73)  


 


Lymphocytes (%) (Auto)   23 % (24-48)  


 


Monocytes (%) (Auto)   8 % (0-9)  


 


Eosinophils (%) (Auto)   2 % (0-3)  


 


Basophils (%) (Auto)   1 % (0-3)  


 


Neutrophils # (Auto)


 


 


 3.5 x10^3uL


(1.8-7.7) 





 


Lymphocytes # (Auto)


 


 


 1.2 x10^3/uL


(1.0-4.8) 





 


Monocytes # (Auto)


 


 


 0.4 x10^3/uL


(0.0-1.1) 





 


Eosinophils # (Auto)


 


 


 0.1 x10^3/uL


(0.0-0.7) 





 


Basophils # (Auto)


 


 


 0.1 x10^3/uL


(0.0-0.2) 





 


Prothrombin Time


 


 


 37.6 SEC


(11.7-14.0) 





 


Prothromb Time International


Ratio 


 


 3.9 (0.8-1.1) 


 





 


Sodium Level


 


 


 137 mmol/L


(136-145) 





 


Potassium Level


 


 


 4.5 mmol/L


(3.5-5.1) 





 


Chloride Level


 


 


 102 mmol/L


() 





 


Carbon Dioxide Level


 


 


 28 mmol/L


(21-32) 





 


Anion Gap   7 (6-14)  


 


Blood Urea Nitrogen   9 mg/dL (7-20)  


 


Creatinine


 


 


 0.7 mg/dL


(0.6-1.0) 





 


Estimated GFR


(Cockcroft-Gault) 


 


 91.8 


 





 


BUN/Creatinine Ratio   13 (6-20)  


 


Glucose Level


 


 


 343 mg/dL


(70-99) 





 


Calcium Level


 


 


 8.6 mg/dL


(8.5-10.1) 





 


Total Bilirubin


 


 


 0.3 mg/dL


(0.2-1.0) 





 


Aspartate Amino Transf


(AST/SGOT) 


 


 38 U/L (15-37) 


 





 


Alanine Aminotransferase


(ALT/SGPT) 


 


 69 U/L (14-59) 


 





 


Alkaline Phosphatase


 


 


 88 U/L


() 





 


Total Protein


 


 


 6.4 g/dL


(6.4-8.2) 





 


Albumin


 


 


 2.7 g/dL


(3.4-5.0) 





 


Albumin/Globulin Ratio   0.7 (1.0-1.7)  


 


Test


 11/6/18


11:40 


 


 





 


Glucose (Fingerstick)


 296 mg/dL


(70-99) 


 


 











Medications





Current Medications


Fentanyl Citrate (Fentanyl 2ml Vial) 50 mcg 1X  ONCE IV  Last administered on 11

/3/18at 20:24;  Start 11/3/18 at 20:15;  Stop 11/3/18 at 20:16;  Status DC


Iohexol (Omnipaque 300 Mg/ml) 100 ml STK-MED ONCE .ROUTE ;  Start 11/3/18 at 21:

08;  Stop 11/3/18 at 21:09;  Status DC


Metronidazole 100 ml @  100 mls/hr 1X  ONCE IV  Last administered on 11/3/18at 

23:04;  Start 11/3/18 at 22:30;  Stop 11/3/18 at 23:29;  Status DC


Aztreonam 1 gm/ Dextrose 50 ml @  100 mls/hr Q6HRS IV ;  Start 11/4/18 at 06:00

;  Stop 11/4/18 at 06:00;  Status DC


Sodium Chloride 1,000 ml @  1,000 mls/hr 1X  ONCE IV  Last administered on 11/3/

18at 23:04;  Start 11/3/18 at 22:30;  Stop 11/3/18 at 23:29;  Status DC


Phytonadione (Mephyton Oral Soln) 2.5 mg 1X  ONCE PO  Last administered on 11/3/

18at 23:05;  Start 11/3/18 at 22:45;  Stop 11/3/18 at 22:46;  Status DC


Morphine Sulfate (Morphine Sulfate) 4 mg PRN Q2HR  PRN IV PAIN Last 

administered on 11/4/18at 20:41;  Start 11/3/18 at 22:45;  Stop 11/4/18 at 22:44

;  Status DC


Sodium Chloride 1,000 ml @  100 mls/hr Q10H IV  Last administered on 11/4/18at 

17:18;  Start 11/3/18 at 22:45;  Stop 11/4/18 at 22:44;  Status DC


Aztreonam 1 gm/ Dextrose 50 ml @  100 mls/hr ONCE  ONCE IV  Last administered 

on 11/4/18at 00:11;  Start 11/3/18 at 23:00;  Stop 11/3/18 at 23:29;  Status DC


Ondansetron HCl (Zofran) 4 mg PRN Q6HRS  PRN IV NAUSEA/VOMITING 1ST CHOICE Last 

administered on 11/5/18at 23:07;  Start 11/4/18 at 01:00


Prochlorperazine Edisylate (Compazine) 10 mg PRN Q6HRS  PRN IV NAUSEA/VOMITING 

2ND CHOICE;  Start 11/4/18 at 01:00


Oxycodone HCl (Roxicodone) 5 mg PRN Q3HRS  PRN PO SEVERE PAIN, SEE COMMENTS 

Last administered on 11/5/18at 00:33;  Start 11/4/18 at 01:00


Ketorolac Tromethamine (Toradol 15mg Vial) 15 mg PRN Q6HRS  PRN IV MILD PAIN 

Last administered on 11/6/18at 08:08;  Start 11/4/18 at 01:00;  Stop 11/9/18 at 

00:59


Lactulose (Lactulose) 20 gm PRN Q12HR  PRN PO CONSTIPATION 2ND CHOICE;  Start 11 /4/18 at 01:00


Insulin Human Lispro (HumaLOG) 0-9 UNITS TIDWMEALS SQ  Last administered on 11/6 /18at 12:44;  Start 11/4/18 at 08:00


Dextrose (Dextrose 50%-Water Syringe) 12.5 gm PRN Q15MIN  PRN IV SEE COMMENTS;  

Start 11/4/18 at 01:00


Clonazepam (KlonoPIN) 0.5 mg DAILY PO  Last administered on 11/4/18at 09:11;  

Start 11/4/18 at 09:00;  Stop 11/5/18 at 08:12;  Status DC


Insulin Glargine (Lantus) 12 units QHS SQ  Last administered on 11/4/18at 20:48

;  Start 11/4/18 at 01:15;  Stop 11/5/18 at 12:02;  Status DC


Pregabalin (Lyrica) 50 mg BID PO  Last administered on 11/6/18at 08:10;  Start 

11/4/18 at 09:00


Sertraline HCl (Zoloft) 50 mg DAILY PO  Last administered on 11/6/18at 08:12;  

Start 11/4/18 at 09:00


Losartan Potassium (Cozaar) 100 mg DAILY PO  Last administered on 11/6/18at 08:

14;  Start 11/4/18 at 09:00


Non-Formulary Medication (Quinapril/ Hydrochlorothiazide (Quinapril-Hctz 10-

12.5 Mg Tab)) 1 each DAILY PO ;  Start 11/4/18 at 09:00;  Status UNV


Atorvastatin Calcium (Lipitor) 40 mg HS PO  Last administered on 11/5/18at 20:04

;  Start 11/4/18 at 21:00


Sumatriptan Succinate (Imitrex) 100 mg PRN Q2HR  PRN PO MIGRAINE HEADACHE;  

Start 11/4/18 at 01:00


Metronidazole 100 ml @  100 mls/hr Q8HRS IV  Last administered on 11/6/18at 05:

56;  Start 11/4/18 at 06:00


Ciprofloxacin/ Dextrose 100 ml @  100 mls/hr Q12HR IV  Last administered on 11/6 /18at 09:56;  Start 11/4/18 at 01:30


Lisinopril (Prinivil) 10 mg DAILY PO  Last administered on 11/5/18at 08:15;  

Start 11/4/18 at 09:00;  Stop 11/5/18 at 15:45;  Status DC


Hydrochlorothiazide (Microzide) 12.5 mg DAILY PO  Last administered on 11/6/ 18at 08:11;  Start 11/4/18 at 09:00


Iohexol (Omnipaque 300 Mg/ml) 100 ml STK-MED ONCE .ROUTE ;  Start 11/4/18 at 03:

45;  Stop 11/4/18 at 03:46;  Status DC


Insulin Glargine (Lantus) 12 units 1X  ONCE SQ  Last administered on 11/4/18at 

09:17;  Start 11/4/18 at 09:00;  Stop 11/4/18 at 09:01;  Status DC


Insulin Human Lispro (HumaLOG) 12 units 1X  ONCE SQ  Last administered on 11/4/ 18at 09:17;  Start 11/4/18 at 09:00;  Stop 11/4/18 at 09:01;  Status DC


Insulin Human Lispro (HumaLOG) 8 units 1X  ONCE SQ  Last administered on 11/4/ 18at 23:23;  Start 11/4/18 at 23:00;  Stop 11/4/18 at 23:03;  Status DC


Morphine Sulfate (Morphine Sulfate) 4 mg PRN Q2HR  PRN IV MOD - SEVERE PAIN 2ND 

CHOICE Last administered on 11/6/18at 09:57;  Start 11/5/18 at 01:00


Clonazepam (KlonoPIN) 0.5 mg PRN DAILY  PRN PO ANXIETY / AGITATION;  Start 11/5/ 18 at 08:15


Lactobacillus Rhamnosus (Culturelle) 1 cap BID PO  Last administered on 11/6/ 18at 08:12;  Start 11/5/18 at 21:00


Insulin Glargine (Lantus) 30 units QHS SQ  Last administered on 11/5/18at 20:15

;  Start 11/5/18 at 21:00;  Stop 11/6/18 at 10:27;  Status DC


Insulin Human Lispro (HumaLOG) 10 units TIDAC SQ  Last administered on 11/6/ 18at 08:15;  Start 11/5/18 at 12:00;  Stop 11/6/18 at 10:27;  Status DC


Influenza Virus Vaccine (Afluria Trivalent [id number] Syringe) 0.5 ml ONCE ONCE 

VAX IM  Last administered on 11/6/18at 08:17;  Start 11/6/18 at 09:00;  Stop 11/ 6/18 at 09:01;  Status DC


Dicyclomine HCl (Bentyl) 10 mg PRN QID  PRN PO abd pain;  Start 11/5/18 at 14:30


Ondansetron HCl (Zofran) 4 mg PRN Q6HRS  PRN IV NAUSEA/VOMITING;  Start 11/5/18 

at 15:45;  Status Cancel


Morphine Sulfate (Morphine Sulfate) 2 mg PRN Q2HR  PRN IV MOD TO SEVERE PAIN 

1ST CHOICE;  Start 11/5/18 at 15:45


Tramadol HCl (Ultram) 50 mg PRN Q6HRS  PRN PO MILD TO MODERATE PAIN Last 

administered on 11/5/18at 17:31;  Start 11/5/18 at 15:45


Docusate Sodium (Colace) 100 mg PRN DAILY  PRN PO CONSTIPATION 1ST CHOICE;  

Start 11/5/18 at 15:45


Insulin Human Lispro (HumaLOG) 4 units 1X  ONCE SQ  Last administered on 11/5/ 18at 22:26;  Start 11/5/18 at 22:00;  Stop 11/5/18 at 22:01;  Status DC


Insulin Glargine (Lantus) 45 units QHS SQ ;  Start 11/6/18 at 21:00


Insulin Human Lispro (HumaLOG) 15 units TIDAC SQ  Last administered on 11/6/ 18at 12:44;  Start 11/6/18 at 11:30





Active Scripts


Active


Reported


Atacand (Candesartan Cilexetil) 32 Mg Tablet 1 Tab PO DAILY


Quinapril-Hctz 10-12.5 Mg Tab (Quinapril/Hydrochlorothiazide) 1 Each Tablet 1 

Each PO DAILY


Relpax (Eletriptan Hbr) 20 Mg Tablet 20 Mg PO 


Reglan (Metoclopramide Hcl) 10 Mg Tablet 10 Mg PO TIDWMEALHC


Lyrica (Pregabalin) 50 Mg Capsule 50 Mg PO BID


Clonazepam 0.5 Mg Tablet 1 Tab PO PRN DAILY PRN


Zoloft (Sertraline Hcl) 50 Mg Tablet 1 Tab PO DAILY


Crestor (Rosuvastatin Calcium) 10 Mg Tablet 1 Tab PO DAILY


Lantus Solostar (Insulin Glargine,Hum.rec.anlog) 100 Unit/1 Ml Insuln.pen 66 

Unit SQ QHS


Vitals/I & O





Vital Sign - Last 24 Hours








 11/5/18 11/5/18 11/5/18 11/5/18





 14:48 15:00 15:20 15:43


 


Temp  97.5  97.5





  97.5  97.5


 


Pulse  70  63


 


Resp  16  18


 


B/P (MAP)  119/63 (81)  124/81


 


Pulse Ox 94 100 100 


 


O2 Delivery Room Air Room Air  


 


    





    





 11/5/18 11/5/18 11/5/18 11/5/18





 16:00 17:00 17:31 17:31


 


Temp 98.8 97.5  





 98.8 97.5  


 


Pulse 63 65  


 


Resp 18 18  


 


B/P (MAP) 125/77 129/79  


 


Pulse Ox   100 


 


O2 Delivery   Room Air Room Air


 


    





    





 11/5/18 11/5/18 11/5/18 11/5/18





 18:00 18:15 18:30 18:31


 


Temp 97.5 97.8 98.1 





 97.5 97.8 98.1 


 


Pulse 71 68 68 


 


Resp 18 18 18 


 


B/P (MAP) 117/66 117/68 113/63 


 


Pulse Ox    94


 


O2 Delivery    Room Air


 


    





    





 11/5/18 11/5/18 11/5/18 11/5/18





 19:30 19:30 19:52 20:05


 


Temp 97.5 97.5  





 97.5 97.5  


 


Pulse 69 69  


 


Resp 18 18  


 


B/P (MAP) 109/70 (83) 109/70  


 


Pulse Ox 90   96


 


O2 Delivery Room Air  Room Air Room Air


 


    





    





 11/5/18 11/5/18 11/5/18 11/5/18





 20:10 20:30 20:30 21:30


 


Temp  97.5 97.5 97.7





  97.5 97.5 97.7


 


Pulse  66 66 66


 


Resp  18 18 18


 


B/P (MAP)  132/87 132/87 (102) 134/76 (95)


 


Pulse Ox   95 96


 


O2 Delivery Room Air  Room Air Room Air


 


    





    





 11/5/18 11/5/18 11/5/18 11/6/18





 21:30 22:30 23:06 03:00


 


Temp 97.7 97.5  98.0





 97.7 97.5  98.0


 


Pulse 66 70  75


 


Resp 18 18  18


 


B/P (MAP) 134/76 135/71 (92)  118/66 (83)


 


Pulse Ox  92  92


 


O2 Delivery  Room Air Room Air Room Air


 


    





    





 11/6/18 11/6/18 11/6/18 11/6/18





 03:37 06:05 07:10 08:00


 


Temp   97.7 





   97.7 


 


Pulse   69 


 


Resp   16 


 


B/P (MAP)   103/58 (73) 


 


Pulse Ox   91 


 


O2 Delivery Room Air Room Air Room Air Room Air


 


    





    





 11/6/18 11/6/18 11/6/18 11/6/18





 08:14 09:57 10:34 11:00


 


Temp    97.9





    97.9


 


Pulse 69   58


 


Resp  20 16 16


 


B/P (MAP) 103/58   109/57 (74)


 


Pulse Ox    92


 


O2 Delivery  Room Air Room Air Room Air














Intake and Output   


 


 11/5/18 11/5/18 11/6/18





 15:00 23:00 07:00


 


Intake Total  419 ml 400 ml


 


Balance  419 ml 400 ml

















TERESA LESLIE MD Nov 6, 2018 14:01

## 2018-11-06 NOTE — PDOC
Subjective:


Subjective:


Lower abd pain persists - constant.


Denies bleeding.


Had a small formed stool yesterday.





Objective:


Objective:


Getting morphine and IV atbx.


Has not tried Bentyl.


Note bumped down to full liquid diet.


Vital Signs:





 Vital Signs








  Date Time  Temp Pulse Resp B/P (MAP) Pulse Ox O2 Delivery O2 Flow Rate FiO2


 


11/6/18 10:34   16   Room Air  


 


11/6/18 08:14  69  103/58    


 


11/6/18 07:10 97.7    91   





 97.7       








Labs:





Laboratory Tests








Test


 11/5/18


17:00 11/5/18


20:10 11/6/18


02:50 11/6/18


07:49


 


Glucose (Fingerstick) 241 mg/dL  291 mg/dL   266 mg/dL 


 


White Blood Count   5.3 x10^3/uL  


 


Red Blood Count   4.30 x10^6/uL  


 


Hemoglobin   12.7 g/dL  


 


Hematocrit   36.7 %  


 


Mean Corpuscular Volume   85 fL  


 


Mean Corpuscular Hemoglobin   29 pg  


 


Mean Corpuscular Hemoglobin


Concent 


 


 35 g/dL 


 





 


Red Cell Distribution Width   13.5 %  


 


Platelet Count   189 x10^3/uL  


 


Neutrophils (%) (Auto)   67 %  


 


Lymphocytes (%) (Auto)   23 %  


 


Monocytes (%) (Auto)   8 %  


 


Eosinophils (%) (Auto)   2 %  


 


Basophils (%) (Auto)   1 %  


 


Neutrophils # (Auto)   3.5 x10^3uL  


 


Lymphocytes # (Auto)   1.2 x10^3/uL  


 


Monocytes # (Auto)   0.4 x10^3/uL  


 


Eosinophils # (Auto)   0.1 x10^3/uL  


 


Basophils # (Auto)   0.1 x10^3/uL  


 


Prothrombin Time   37.6 SEC  


 


Prothromb Time International


Ratio 


 


 3.9 


 





 


Sodium Level   137 mmol/L  


 


Potassium Level   4.5 mmol/L  


 


Chloride Level   102 mmol/L  


 


Carbon Dioxide Level   28 mmol/L  


 


Anion Gap   7  


 


Blood Urea Nitrogen   9 mg/dL  


 


Creatinine   0.7 mg/dL  


 


Estimated GFR


(Cockcroft-Gault) 


 


 91.8 


 





 


BUN/Creatinine Ratio   13  


 


Glucose Level   343 mg/dL  


 


Calcium Level   8.6 mg/dL  


 


Total Bilirubin   0.3 mg/dL  


 


Aspartate Amino Transf


(AST/SGOT) 


 


 38 U/L 


 





 


Alanine Aminotransferase


(ALT/SGPT) 


 


 69 U/L 


 





 


Alkaline Phosphatase   88 U/L  


 


Total Protein   6.4 g/dL  


 


Albumin   2.7 g/dL  


 


Albumin/Globulin Ratio   0.7  











PE:





GEN: NAD, sitting up in bed, appears comfortable


LUNGS: CTAB


HEART: RRR


ABD: obese, BLQ discomfort - bandlike


NEURO/PSYCH: A & O 3





A/P:


Factor V Leiden, Coumadin coagulopathy - better, INR 3.9


Lower abd pain - bilateral


Abnormal CT - "segmental wall thickening of the descending colon and sigmoid 

colon... may be due to incomplete distention"


Mild elevation in AST and ALT - normal liver on CT, s/p salo





--


She says pain remains the same but is tolerating PO w/o diarrhea or bleeding 

and INR is improved.


D/w case management - would ideally pursue colonoscopy as outpatient - will 

review this w/ Dr. Dunn.











CORY GARNER Nov 6, 2018 12:30

## 2018-11-07 VITALS — DIASTOLIC BLOOD PRESSURE: 94 MMHG | SYSTOLIC BLOOD PRESSURE: 158 MMHG

## 2018-11-07 VITALS — SYSTOLIC BLOOD PRESSURE: 133 MMHG | DIASTOLIC BLOOD PRESSURE: 72 MMHG

## 2018-11-07 VITALS — DIASTOLIC BLOOD PRESSURE: 88 MMHG | SYSTOLIC BLOOD PRESSURE: 136 MMHG

## 2018-11-07 VITALS — DIASTOLIC BLOOD PRESSURE: 76 MMHG | SYSTOLIC BLOOD PRESSURE: 126 MMHG

## 2018-11-07 VITALS — SYSTOLIC BLOOD PRESSURE: 125 MMHG | DIASTOLIC BLOOD PRESSURE: 85 MMHG

## 2018-11-07 VITALS — DIASTOLIC BLOOD PRESSURE: 45 MMHG | SYSTOLIC BLOOD PRESSURE: 109 MMHG

## 2018-11-07 LAB
ANION GAP SERPL CALC-SCNC: 8 MMOL/L (ref 6–14)
BASOPHILS # BLD AUTO: 0 X10^3/UL (ref 0–0.2)
BASOPHILS NFR BLD: 1 % (ref 0–3)
BUN SERPL-MCNC: 11 MG/DL (ref 7–20)
CALCIUM SERPL-MCNC: 9.1 MG/DL (ref 8.5–10.1)
CHLORIDE SERPL-SCNC: 103 MMOL/L (ref 98–107)
CO2 SERPL-SCNC: 29 MMOL/L (ref 21–32)
CREAT SERPL-MCNC: 0.6 MG/DL (ref 0.6–1)
EOSINOPHIL NFR BLD: 0.1 X10^3/UL (ref 0–0.7)
EOSINOPHIL NFR BLD: 2 % (ref 0–3)
ERYTHROCYTE [DISTWIDTH] IN BLOOD BY AUTOMATED COUNT: 13.9 % (ref 11.5–14.5)
GFR SERPLBLD BASED ON 1.73 SQ M-ARVRAT: 109.6 ML/MIN
GLUCOSE SERPL-MCNC: 237 MG/DL (ref 70–99)
HCT VFR BLD CALC: 38.9 % (ref 36–47)
HGB BLD-MCNC: 13.3 G/DL (ref 12–15.5)
LYMPHOCYTES # BLD: 1.2 X10^3/UL (ref 1–4.8)
LYMPHOCYTES NFR BLD AUTO: 22 % (ref 24–48)
MCH RBC QN AUTO: 29 PG (ref 25–35)
MCHC RBC AUTO-ENTMCNC: 34 G/DL (ref 31–37)
MCV RBC AUTO: 86 FL (ref 79–100)
MONO #: 0.4 X10^3/UL (ref 0–1.1)
MONOCYTES NFR BLD: 7 % (ref 0–9)
NEUT #: 3.7 X10^3UL (ref 1.8–7.7)
NEUTROPHILS NFR BLD AUTO: 68 % (ref 31–73)
PLATELET # BLD AUTO: 200 X10^3/UL (ref 140–400)
POTASSIUM SERPL-SCNC: 4 MMOL/L (ref 3.5–5.1)
PROTHROMBIN TIME: 47.6 SEC (ref 11.7–14)
RBC # BLD AUTO: 4.56 X10^6/UL (ref 3.5–5.4)
SODIUM SERPL-SCNC: 140 MMOL/L (ref 136–145)
WBC # BLD AUTO: 5.4 X10^3/UL (ref 4–11)

## 2018-11-07 RX ADMIN — Medication SCH CAP: at 08:10

## 2018-11-07 RX ADMIN — INSULIN LISPRO SCH UNITS: 100 INJECTION, SOLUTION INTRAVENOUS; SUBCUTANEOUS at 17:20

## 2018-11-07 RX ADMIN — CIPROFLOXACIN HYDROCHLORIDE SCH MG: 250 TABLET, FILM COATED ORAL at 20:24

## 2018-11-07 RX ADMIN — KETOROLAC TROMETHAMINE PRN MG: 15 INJECTION, SOLUTION INTRAMUSCULAR; INTRAVENOUS at 08:23

## 2018-11-07 RX ADMIN — MORPHINE SULFATE PRN MG: 2 INJECTION, SOLUTION INTRAMUSCULAR; INTRAVENOUS at 20:21

## 2018-11-07 RX ADMIN — INSULIN LISPRO SCH UNITS: 100 INJECTION, SOLUTION INTRAVENOUS; SUBCUTANEOUS at 12:15

## 2018-11-07 RX ADMIN — KETOROLAC TROMETHAMINE PRN MG: 15 INJECTION, SOLUTION INTRAMUSCULAR; INTRAVENOUS at 02:16

## 2018-11-07 RX ADMIN — PREGABALIN SCH MG: 50 CAPSULE ORAL at 08:10

## 2018-11-07 RX ADMIN — Medication SCH CAP: at 20:24

## 2018-11-07 RX ADMIN — MORPHINE SULFATE PRN MG: 4 INJECTION, SOLUTION INTRAMUSCULAR; INTRAVENOUS at 02:17

## 2018-11-07 RX ADMIN — MORPHINE SULFATE PRN MG: 4 INJECTION, SOLUTION INTRAMUSCULAR; INTRAVENOUS at 12:12

## 2018-11-07 RX ADMIN — ATORVASTATIN CALCIUM SCH MG: 40 TABLET, FILM COATED ORAL at 20:24

## 2018-11-07 RX ADMIN — INSULIN LISPRO SCH UNITS: 100 INJECTION, SOLUTION INTRAVENOUS; SUBCUTANEOUS at 08:14

## 2018-11-07 RX ADMIN — PREGABALIN SCH MG: 50 CAPSULE ORAL at 20:24

## 2018-11-07 RX ADMIN — ONDANSETRON PRN MG: 2 INJECTION INTRAMUSCULAR; INTRAVENOUS at 12:18

## 2018-11-07 RX ADMIN — KETOROLAC TROMETHAMINE PRN MG: 15 INJECTION, SOLUTION INTRAMUSCULAR; INTRAVENOUS at 16:57

## 2018-11-07 RX ADMIN — INSULIN LISPRO SCH UNITS: 100 INJECTION, SOLUTION INTRAVENOUS; SUBCUTANEOUS at 08:18

## 2018-11-07 RX ADMIN — KETOROLAC TROMETHAMINE PRN MG: 15 INJECTION, SOLUTION INTRAMUSCULAR; INTRAVENOUS at 23:40

## 2018-11-07 RX ADMIN — MORPHINE SULFATE PRN MG: 2 INJECTION, SOLUTION INTRAMUSCULAR; INTRAVENOUS at 16:57

## 2018-11-07 RX ADMIN — INSULIN LISPRO SCH UNITS: 100 INJECTION, SOLUTION INTRAVENOUS; SUBCUTANEOUS at 12:14

## 2018-11-07 RX ADMIN — SERTRALINE HYDROCHLORIDE SCH MG: 50 TABLET ORAL at 08:20

## 2018-11-07 RX ADMIN — MORPHINE SULFATE PRN MG: 4 INJECTION, SOLUTION INTRAMUSCULAR; INTRAVENOUS at 08:24

## 2018-11-07 RX ADMIN — LOSARTAN POTASSIUM SCH MG: 50 TABLET ORAL at 08:11

## 2018-11-07 RX ADMIN — POLYETHYLENE GLYCOL 3350 SCH GM: 17 POWDER, FOR SOLUTION ORAL at 17:01

## 2018-11-07 RX ADMIN — CIPROFLOXACIN SCH MLS/HR: 2 INJECTION INTRAVENOUS at 08:12

## 2018-11-07 NOTE — PDOC
PROGRESS NOTES


Chief Complaint


Chief Complaint


Abdominal Pain with possible colitis


Acute blood loss -  LGIB, hb stable


Migraines


DM - with A1c of 12.6


FV leiden


Supratherapeutic INR on warfarin


Fall


Transaminitis


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





plan:


fu with GI


on cipro, flagyl


advance to gi soft diet


monitor hb ,INR daily, warfarin held


2FFP 11/5, vit k today


increase lantus to 60u qhs, aspart 20u tid, ssi, lantus 30u x1 now. 


talked to gi, consider dc abx. 


dc in 1-2ds.





History of Present Illness


History of Present Illness


44 yo F w/ PMHx DM, migraines, factor V Leiden deficiency (multiple PE, DVT on 

lifelong anticoagulation with coumadin) p/w abdominal pain. Onset 2 days ago, 

associated with blood in stool


Pain is cramping, colicky diffuse bilateral upper quadrant radiates to the back 

also some pain with moving her left hip. 





 


has abd pain since fall last Thursday. no diarrhea ever. 





no bloody stool since 11/5, still abd pain 11/6, 8/10, vomited 11/5 night. 11/7

, abd pain slightly better, 6-7/10


no BM since 11/5. 





INR 3.8 after 2 FFP 11/5


today 5





STILL hyperglycemia





Vitals


Vitals





Vital Signs








  Date Time  Temp Pulse Resp B/P (MAP) Pulse Ox O2 Delivery O2 Flow Rate FiO2


 


11/7/18 11:00 97.7 75 20 136/88 (104) 90 Room Air  





 97.7       











Physical Exam


General:  Alert, Oriented X3, Cooperative, No acute distress


Abdomen:  Normal bowel sounds, Soft, Other (Tenderness in bilateral upper 

quadrants, epigastrium and LLQ)


Extremities:  No clubbing, No cyanosis, No edema, Normal pulses, No tenderness/

swelling


Skin:  No rashes, No breakdown, No significant lesion





Labs


LABS





Laboratory Tests








Test


 11/6/18


16:25 11/6/18


21:19 11/7/18


03:20 11/7/18


07:29


 


Glucose (Fingerstick)


 197 mg/dL


(70-99) 203 mg/dL


(70-99) 


 253 mg/dL


(70-99)


 


White Blood Count


 


 


 5.4 x10^3/uL


(4.0-11.0) 





 


Red Blood Count


 


 


 4.56 x10^6/uL


(3.50-5.40) 





 


Hemoglobin


 


 


 13.3 g/dL


(12.0-15.5) 





 


Hematocrit


 


 


 38.9 %


(36.0-47.0) 





 


Mean Corpuscular Volume   86 fL ()  


 


Mean Corpuscular Hemoglobin   29 pg (25-35)  


 


Mean Corpuscular Hemoglobin


Concent 


 


 34 g/dL


(31-37) 





 


Red Cell Distribution Width


 


 


 13.9 %


(11.5-14.5) 





 


Platelet Count


 


 


 200 x10^3/uL


(140-400) 





 


Neutrophils (%) (Auto)   68 % (31-73)  


 


Lymphocytes (%) (Auto)   22 % (24-48)  


 


Monocytes (%) (Auto)   7 % (0-9)  


 


Eosinophils (%) (Auto)   2 % (0-3)  


 


Basophils (%) (Auto)   1 % (0-3)  


 


Neutrophils # (Auto)


 


 


 3.7 x10^3uL


(1.8-7.7) 





 


Lymphocytes # (Auto)


 


 


 1.2 x10^3/uL


(1.0-4.8) 





 


Monocytes # (Auto)


 


 


 0.4 x10^3/uL


(0.0-1.1) 





 


Eosinophils # (Auto)


 


 


 0.1 x10^3/uL


(0.0-0.7) 





 


Basophils # (Auto)


 


 


 0.0 x10^3/uL


(0.0-0.2) 





 


Prothrombin Time


 


 


 47.6 SEC


(11.7-14.0) 





 


Prothromb Time International


Ratio 


 


 5.3 (0.8-1.1) 


 





 


Sodium Level


 


 


 140 mmol/L


(136-145) 





 


Potassium Level


 


 


 4.0 mmol/L


(3.5-5.1) 





 


Chloride Level


 


 


 103 mmol/L


() 





 


Carbon Dioxide Level


 


 


 29 mmol/L


(21-32) 





 


Anion Gap   8 (6-14)  


 


Blood Urea Nitrogen


 


 


 11 mg/dL


(7-20) 





 


Creatinine


 


 


 0.6 mg/dL


(0.6-1.0) 





 


Estimated GFR


(Cockcroft-Gault) 


 


 109.6 


 





 


Glucose Level


 


 


 237 mg/dL


(70-99) 





 


Calcium Level


 


 


 9.1 mg/dL


(8.5-10.1) 





 


Test


 11/7/18


11:23 


 


 





 


Glucose (Fingerstick)


 312 mg/dL


(70-99) 


 


 














Assessment and Plan


Assessmemt and Plan


Problems


Medical Problems:


(1) Coumadin toxicity


Status: Acute  











Comment


Review of Relevant


I have reviewed the following items tucker (where applicable) has been applied.


Labs





Laboratory Tests








Test


 11/5/18


17:00 11/5/18


20:10 11/6/18


02:50 11/6/18


07:49


 


Glucose (Fingerstick)


 241 mg/dL


(70-99) 291 mg/dL


(70-99) 


 266 mg/dL


(70-99)


 


White Blood Count


 


 


 5.3 x10^3/uL


(4.0-11.0) 





 


Red Blood Count


 


 


 4.30 x10^6/uL


(3.50-5.40) 





 


Hemoglobin


 


 


 12.7 g/dL


(12.0-15.5) 





 


Hematocrit


 


 


 36.7 %


(36.0-47.0) 





 


Mean Corpuscular Volume   85 fL ()  


 


Mean Corpuscular Hemoglobin   29 pg (25-35)  


 


Mean Corpuscular Hemoglobin


Concent 


 


 35 g/dL


(31-37) 





 


Red Cell Distribution Width


 


 


 13.5 %


(11.5-14.5) 





 


Platelet Count


 


 


 189 x10^3/uL


(140-400) 





 


Neutrophils (%) (Auto)   67 % (31-73)  


 


Lymphocytes (%) (Auto)   23 % (24-48)  


 


Monocytes (%) (Auto)   8 % (0-9)  


 


Eosinophils (%) (Auto)   2 % (0-3)  


 


Basophils (%) (Auto)   1 % (0-3)  


 


Neutrophils # (Auto)


 


 


 3.5 x10^3uL


(1.8-7.7) 





 


Lymphocytes # (Auto)


 


 


 1.2 x10^3/uL


(1.0-4.8) 





 


Monocytes # (Auto)


 


 


 0.4 x10^3/uL


(0.0-1.1) 





 


Eosinophils # (Auto)


 


 


 0.1 x10^3/uL


(0.0-0.7) 





 


Basophils # (Auto)


 


 


 0.1 x10^3/uL


(0.0-0.2) 





 


Prothrombin Time


 


 


 37.6 SEC


(11.7-14.0) 





 


Prothromb Time International


Ratio 


 


 3.9 (0.8-1.1) 


 





 


Sodium Level


 


 


 137 mmol/L


(136-145) 





 


Potassium Level


 


 


 4.5 mmol/L


(3.5-5.1) 





 


Chloride Level


 


 


 102 mmol/L


() 





 


Carbon Dioxide Level


 


 


 28 mmol/L


(21-32) 





 


Anion Gap   7 (6-14)  


 


Blood Urea Nitrogen   9 mg/dL (7-20)  


 


Creatinine


 


 


 0.7 mg/dL


(0.6-1.0) 





 


Estimated GFR


(Cockcroft-Gault) 


 


 91.8 


 





 


BUN/Creatinine Ratio   13 (6-20)  


 


Glucose Level


 


 


 343 mg/dL


(70-99) 





 


Calcium Level


 


 


 8.6 mg/dL


(8.5-10.1) 





 


Total Bilirubin


 


 


 0.3 mg/dL


(0.2-1.0) 





 


Aspartate Amino Transf


(AST/SGOT) 


 


 38 U/L (15-37) 


 





 


Alanine Aminotransferase


(ALT/SGPT) 


 


 69 U/L (14-59) 


 





 


Alkaline Phosphatase


 


 


 88 U/L


() 





 


Total Protein


 


 


 6.4 g/dL


(6.4-8.2) 





 


Albumin


 


 


 2.7 g/dL


(3.4-5.0) 





 


Albumin/Globulin Ratio   0.7 (1.0-1.7)  


 


Test


 11/6/18


11:40 11/6/18


16:25 11/6/18


21:19 11/7/18


03:20


 


Glucose (Fingerstick)


 296 mg/dL


(70-99) 197 mg/dL


(70-99) 203 mg/dL


(70-99) 





 


White Blood Count


 


 


 


 5.4 x10^3/uL


(4.0-11.0)


 


Red Blood Count


 


 


 


 4.56 x10^6/uL


(3.50-5.40)


 


Hemoglobin


 


 


 


 13.3 g/dL


(12.0-15.5)


 


Hematocrit


 


 


 


 38.9 %


(36.0-47.0)


 


Mean Corpuscular Volume    86 fL () 


 


Mean Corpuscular Hemoglobin    29 pg (25-35) 


 


Mean Corpuscular Hemoglobin


Concent 


 


 


 34 g/dL


(31-37)


 


Red Cell Distribution Width


 


 


 


 13.9 %


(11.5-14.5)


 


Platelet Count


 


 


 


 200 x10^3/uL


(140-400)


 


Neutrophils (%) (Auto)    68 % (31-73) 


 


Lymphocytes (%) (Auto)    22 % (24-48) 


 


Monocytes (%) (Auto)    7 % (0-9) 


 


Eosinophils (%) (Auto)    2 % (0-3) 


 


Basophils (%) (Auto)    1 % (0-3) 


 


Neutrophils # (Auto)


 


 


 


 3.7 x10^3uL


(1.8-7.7)


 


Lymphocytes # (Auto)


 


 


 


 1.2 x10^3/uL


(1.0-4.8)


 


Monocytes # (Auto)


 


 


 


 0.4 x10^3/uL


(0.0-1.1)


 


Eosinophils # (Auto)


 


 


 


 0.1 x10^3/uL


(0.0-0.7)


 


Basophils # (Auto)


 


 


 


 0.0 x10^3/uL


(0.0-0.2)


 


Prothrombin Time


 


 


 


 47.6 SEC


(11.7-14.0)


 


Prothromb Time International


Ratio 


 


 


 5.3 (0.8-1.1) 





 


Sodium Level


 


 


 


 140 mmol/L


(136-145)


 


Potassium Level


 


 


 


 4.0 mmol/L


(3.5-5.1)


 


Chloride Level


 


 


 


 103 mmol/L


()


 


Carbon Dioxide Level


 


 


 


 29 mmol/L


(21-32)


 


Anion Gap    8 (6-14) 


 


Blood Urea Nitrogen


 


 


 


 11 mg/dL


(7-20)


 


Creatinine


 


 


 


 0.6 mg/dL


(0.6-1.0)


 


Estimated GFR


(Cockcroft-Gault) 


 


 


 109.6 





 


Glucose Level


 


 


 


 237 mg/dL


(70-99)


 


Calcium Level


 


 


 


 9.1 mg/dL


(8.5-10.1)


 


Test


 11/7/18


07:29 11/7/18


11:23 


 





 


Glucose (Fingerstick)


 253 mg/dL


(70-99) 312 mg/dL


(70-99) 


 











Laboratory Tests








Test


 11/6/18


16:25 11/6/18


21:19 11/7/18


03:20 11/7/18


07:29


 


Glucose (Fingerstick)


 197 mg/dL


(70-99) 203 mg/dL


(70-99) 


 253 mg/dL


(70-99)


 


White Blood Count


 


 


 5.4 x10^3/uL


(4.0-11.0) 





 


Red Blood Count


 


 


 4.56 x10^6/uL


(3.50-5.40) 





 


Hemoglobin


 


 


 13.3 g/dL


(12.0-15.5) 





 


Hematocrit


 


 


 38.9 %


(36.0-47.0) 





 


Mean Corpuscular Volume   86 fL ()  


 


Mean Corpuscular Hemoglobin   29 pg (25-35)  


 


Mean Corpuscular Hemoglobin


Concent 


 


 34 g/dL


(31-37) 





 


Red Cell Distribution Width


 


 


 13.9 %


(11.5-14.5) 





 


Platelet Count


 


 


 200 x10^3/uL


(140-400) 





 


Neutrophils (%) (Auto)   68 % (31-73)  


 


Lymphocytes (%) (Auto)   22 % (24-48)  


 


Monocytes (%) (Auto)   7 % (0-9)  


 


Eosinophils (%) (Auto)   2 % (0-3)  


 


Basophils (%) (Auto)   1 % (0-3)  


 


Neutrophils # (Auto)


 


 


 3.7 x10^3uL


(1.8-7.7) 





 


Lymphocytes # (Auto)


 


 


 1.2 x10^3/uL


(1.0-4.8) 





 


Monocytes # (Auto)


 


 


 0.4 x10^3/uL


(0.0-1.1) 





 


Eosinophils # (Auto)


 


 


 0.1 x10^3/uL


(0.0-0.7) 





 


Basophils # (Auto)


 


 


 0.0 x10^3/uL


(0.0-0.2) 





 


Prothrombin Time


 


 


 47.6 SEC


(11.7-14.0) 





 


Prothromb Time International


Ratio 


 


 5.3 (0.8-1.1) 


 





 


Sodium Level


 


 


 140 mmol/L


(136-145) 





 


Potassium Level


 


 


 4.0 mmol/L


(3.5-5.1) 





 


Chloride Level


 


 


 103 mmol/L


() 





 


Carbon Dioxide Level


 


 


 29 mmol/L


(21-32) 





 


Anion Gap   8 (6-14)  


 


Blood Urea Nitrogen


 


 


 11 mg/dL


(7-20) 





 


Creatinine


 


 


 0.6 mg/dL


(0.6-1.0) 





 


Estimated GFR


(Cockcroft-Gault) 


 


 109.6 


 





 


Glucose Level


 


 


 237 mg/dL


(70-99) 





 


Calcium Level


 


 


 9.1 mg/dL


(8.5-10.1) 





 


Test


 11/7/18


11:23 


 


 





 


Glucose (Fingerstick)


 312 mg/dL


(70-99) 


 


 











Medications





Current Medications


Fentanyl Citrate (Fentanyl 2ml Vial) 50 mcg 1X  ONCE IV  Last administered on 11

/3/18at 20:24;  Start 11/3/18 at 20:15;  Stop 11/3/18 at 20:16;  Status DC


Iohexol (Omnipaque 300 Mg/ml) 100 ml STK-MED ONCE .ROUTE ;  Start 11/3/18 at 21:

08;  Stop 11/3/18 at 21:09;  Status DC


Metronidazole 100 ml @  100 mls/hr 1X  ONCE IV  Last administered on 11/3/18at 

23:04;  Start 11/3/18 at 22:30;  Stop 11/3/18 at 23:29;  Status DC


Aztreonam 1 gm/ Dextrose 50 ml @  100 mls/hr Q6HRS IV ;  Start 11/4/18 at 06:00

;  Stop 11/4/18 at 06:00;  Status DC


Sodium Chloride 1,000 ml @  1,000 mls/hr 1X  ONCE IV  Last administered on 11/3/

18at 23:04;  Start 11/3/18 at 22:30;  Stop 11/3/18 at 23:29;  Status DC


Phytonadione (Mephyton Oral Soln) 2.5 mg 1X  ONCE PO  Last administered on 11/3/

18at 23:05;  Start 11/3/18 at 22:45;  Stop 11/3/18 at 22:46;  Status DC


Morphine Sulfate (Morphine Sulfate) 4 mg PRN Q2HR  PRN IV PAIN Last 

administered on 11/4/18at 20:41;  Start 11/3/18 at 22:45;  Stop 11/4/18 at 22:44

;  Status DC


Sodium Chloride 1,000 ml @  100 mls/hr Q10H IV  Last administered on 11/4/18at 

17:18;  Start 11/3/18 at 22:45;  Stop 11/4/18 at 22:44;  Status DC


Aztreonam 1 gm/ Dextrose 50 ml @  100 mls/hr ONCE  ONCE IV  Last administered 

on 11/4/18at 00:11;  Start 11/3/18 at 23:00;  Stop 11/3/18 at 23:29;  Status DC


Ondansetron HCl (Zofran) 4 mg PRN Q6HRS  PRN IV NAUSEA/VOMITING 1ST CHOICE Last 

administered on 11/5/18at 23:07;  Start 11/4/18 at 01:00


Prochlorperazine Edisylate (Compazine) 10 mg PRN Q6HRS  PRN IV NAUSEA/VOMITING 

2ND CHOICE;  Start 11/4/18 at 01:00


Oxycodone HCl (Roxicodone) 5 mg PRN Q3HRS  PRN PO SEVERE PAIN, SEE COMMENTS 

Last administered on 11/5/18at 00:33;  Start 11/4/18 at 01:00


Ketorolac Tromethamine (Toradol 15mg Vial) 15 mg PRN Q6HRS  PRN IV MILD PAIN 

Last administered on 11/7/18at 08:23;  Start 11/4/18 at 01:00;  Stop 11/9/18 at 

00:59


Lactulose (Lactulose) 20 gm PRN Q12HR  PRN PO CONSTIPATION 2ND CHOICE;  Start 11 /4/18 at 01:00


Insulin Human Lispro (HumaLOG) 0-9 UNITS TIDWMEALS SQ  Last administered on 11/7 /18at 08:18;  Start 11/4/18 at 08:00


Dextrose (Dextrose 50%-Water Syringe) 12.5 gm PRN Q15MIN  PRN IV SEE COMMENTS;  

Start 11/4/18 at 01:00


Clonazepam (KlonoPIN) 0.5 mg DAILY PO  Last administered on 11/4/18at 09:11;  

Start 11/4/18 at 09:00;  Stop 11/5/18 at 08:12;  Status DC


Insulin Glargine (Lantus) 12 units QHS SQ  Last administered on 11/4/18at 20:48

;  Start 11/4/18 at 01:15;  Stop 11/5/18 at 12:02;  Status DC


Pregabalin (Lyrica) 50 mg BID PO  Last administered on 11/7/18at 08:10;  Start 

11/4/18 at 09:00


Sertraline HCl (Zoloft) 50 mg DAILY PO  Last administered on 11/7/18at 08:20;  

Start 11/4/18 at 09:00


Losartan Potassium (Cozaar) 100 mg DAILY PO  Last administered on 11/7/18at 08:

11;  Start 11/4/18 at 09:00


Non-Formulary Medication (Quinapril/ Hydrochlorothiazide (Quinapril-Hctz 10-

12.5 Mg Tab)) 1 each DAILY PO ;  Start 11/4/18 at 09:00;  Status UNV


Atorvastatin Calcium (Lipitor) 40 mg HS PO  Last administered on 11/6/18at 21:34

;  Start 11/4/18 at 21:00


Sumatriptan Succinate (Imitrex) 100 mg PRN Q2HR  PRN PO MIGRAINE HEADACHE;  

Start 11/4/18 at 01:00


Metronidazole 100 ml @  100 mls/hr Q8HRS IV  Last administered on 11/7/18at 06:

20;  Start 11/4/18 at 06:00


Ciprofloxacin/ Dextrose 100 ml @  100 mls/hr Q12HR IV  Last administered on 11/7 /18at 08:12;  Start 11/4/18 at 01:30


Lisinopril (Prinivil) 10 mg DAILY PO  Last administered on 11/5/18at 08:15;  

Start 11/4/18 at 09:00;  Stop 11/5/18 at 15:45;  Status DC


Hydrochlorothiazide (Microzide) 12.5 mg DAILY PO  Last administered on 11/7/ 18at 08:10;  Start 11/4/18 at 09:00


Iohexol (Omnipaque 300 Mg/ml) 100 ml STK-MED ONCE .ROUTE ;  Start 11/4/18 at 03:

45;  Stop 11/4/18 at 03:46;  Status DC


Insulin Glargine (Lantus) 12 units 1X  ONCE SQ  Last administered on 11/4/18at 

09:17;  Start 11/4/18 at 09:00;  Stop 11/4/18 at 09:01;  Status DC


Insulin Human Lispro (HumaLOG) 12 units 1X  ONCE SQ  Last administered on 11/4/ 18at 09:17;  Start 11/4/18 at 09:00;  Stop 11/4/18 at 09:01;  Status DC


Insulin Human Lispro (HumaLOG) 8 units 1X  ONCE SQ  Last administered on 11/4/ 18at 23:23;  Start 11/4/18 at 23:00;  Stop 11/4/18 at 23:03;  Status DC


Morphine Sulfate (Morphine Sulfate) 4 mg PRN Q2HR  PRN IV MOD - SEVERE PAIN 2ND 

CHOICE Last administered on 11/7/18at 08:24;  Start 11/5/18 at 01:00


Clonazepam (KlonoPIN) 0.5 mg PRN DAILY  PRN PO ANXIETY / AGITATION;  Start 11/5/ 18 at 08:15


Lactobacillus Rhamnosus (Culturelle) 1 cap BID PO  Last administered on 11/7/ 18at 08:10;  Start 11/5/18 at 21:00


Insulin Glargine (Lantus) 30 units QHS SQ  Last administered on 11/5/18at 20:15

;  Start 11/5/18 at 21:00;  Stop 11/6/18 at 10:27;  Status DC


Insulin Human Lispro (HumaLOG) 10 units TIDAC SQ  Last administered on 11/6/ 18at 08:15;  Start 11/5/18 at 12:00;  Stop 11/6/18 at 10:27;  Status DC


Influenza Virus Vaccine (Afluria Trivalent 2258-4375 Syringe) 0.5 ml ONCE ONCE 

VAX IM  Last administered on 11/6/18at 08:17;  Start 11/6/18 at 09:00;  Stop 11/ 6/18 at 09:01;  Status DC


Dicyclomine HCl (Bentyl) 10 mg PRN QID  PRN PO abd pain;  Start 11/5/18 at 14:30


Ondansetron HCl (Zofran) 4 mg PRN Q6HRS  PRN IV NAUSEA/VOMITING;  Start 11/5/18 

at 15:45;  Status Cancel


Morphine Sulfate (Morphine Sulfate) 2 mg PRN Q2HR  PRN IV MOD TO SEVERE PAIN 

1ST CHOICE;  Start 11/5/18 at 15:45


Tramadol HCl (Ultram) 50 mg PRN Q6HRS  PRN PO MILD TO MODERATE PAIN Last 

administered on 11/5/18at 17:31;  Start 11/5/18 at 15:45


Docusate Sodium (Colace) 100 mg PRN DAILY  PRN PO CONSTIPATION 1ST CHOICE;  

Start 11/5/18 at 15:45


Insulin Human Lispro (HumaLOG) 4 units 1X  ONCE SQ  Last administered on 11/5/ 18at 22:26;  Start 11/5/18 at 22:00;  Stop 11/5/18 at 22:01;  Status DC


Insulin Glargine (Lantus) 45 units QHS SQ  Last administered on 11/6/18at 21:40

;  Start 11/6/18 at 21:00;  Stop 11/7/18 at 10:27;  Status DC


Insulin Human Lispro (HumaLOG) 15 units TIDAC SQ  Last administered on 11/7/ 18at 08:14;  Start 11/6/18 at 11:30;  Stop 11/7/18 at 10:27;  Status DC


Insulin Glargine (Lantus) 60 units QHS SQ ;  Start 11/7/18 at 21:00


Insulin Human Lispro (HumaLOG) 20 units TIDAC SQ ;  Start 11/7/18 at 11:30


Phytonadione 10 mg/Dextrose 51 ml @  102 mls/hr 1X  ONCE IV ;  Start 11/7/18 at 

11:00;  Stop 11/7/18 at 11:29;  Status DC





Active Scripts


Active


Reported


Atacand (Candesartan Cilexetil) 32 Mg Tablet 1 Tab PO DAILY


Quinapril-Hctz 10-12.5 Mg Tab (Quinapril/Hydrochlorothiazide) 1 Each Tablet 1 

Each PO DAILY


Relpax (Eletriptan Hbr) 20 Mg Tablet 20 Mg PO 


Reglan (Metoclopramide Hcl) 10 Mg Tablet 10 Mg PO TIDWMEALHC


Lyrica (Pregabalin) 50 Mg Capsule 50 Mg PO BID


Clonazepam 0.5 Mg Tablet 1 Tab PO PRN DAILY PRN


Zoloft (Sertraline Hcl) 50 Mg Tablet 1 Tab PO DAILY


Crestor (Rosuvastatin Calcium) 10 Mg Tablet 1 Tab PO DAILY


Lantus Solostar (Insulin Glargine,Hum.rec.anlog) 100 Unit/1 Ml Insuln.pen 66 

Unit SQ QHS


Vitals/I & O





Vital Sign - Last 24 Hours








 11/6/18 11/6/18 11/6/18 11/6/18





 14:58 15:00 18:40 19:15


 


Temp  97.7  





  97.7  


 


Pulse  66  


 


Resp  18 16 16


 


B/P (MAP)  111/58 (75)  


 


Pulse Ox 92 91  


 


O2 Delivery Room Air Room Air Room Air 


 


    





    





 11/6/18 11/6/18 11/6/18 11/6/18





 19:30 20:00 21:34 23:25


 


Temp 98.5   97.3





 98.5   97.3


 


Pulse 65   62


 


Resp 18   16


 


B/P (MAP) 119/72 (88)   141/73 (95)


 


Pulse Ox 95   94


 


O2 Delivery Room Air Room Air Room Air Room Air


 


    





    





 11/7/18 11/7/18 11/7/18 11/7/18





 02:17 02:47 03:19 07:00


 


Temp   98.0 97.7





   98.0 97.7


 


Pulse   63 75


 


Resp 16  16 20


 


B/P (MAP)   125/85 (98) 109/45 (66)


 


Pulse Ox   91 91


 


O2 Delivery Room Air Room Air Room Air Room Air


 


    





    





 11/7/18 11/7/18 11/7/18 





 08:11 08:24 11:00 


 


Temp   97.7 





   97.7 


 


Pulse 75  75 


 


Resp  16 20 


 


B/P (MAP) 109/45  136/88 (104) 


 


Pulse Ox   90 


 


O2 Delivery  Room Air Room Air 














Intake and Output   


 


 11/6/18 11/6/18 11/7/18





 15:00 23:00 07:00


 


Intake Total 250 ml 150 ml 780 ml


 


Balance 250 ml 150 ml 780 ml

















TERESA LESLIE MD Nov 7, 2018 12:23

## 2018-11-08 VITALS — DIASTOLIC BLOOD PRESSURE: 91 MMHG | SYSTOLIC BLOOD PRESSURE: 144 MMHG

## 2018-11-08 VITALS — SYSTOLIC BLOOD PRESSURE: 109 MMHG | DIASTOLIC BLOOD PRESSURE: 95 MMHG

## 2018-11-08 VITALS — SYSTOLIC BLOOD PRESSURE: 130 MMHG | DIASTOLIC BLOOD PRESSURE: 80 MMHG

## 2018-11-08 VITALS — DIASTOLIC BLOOD PRESSURE: 83 MMHG | SYSTOLIC BLOOD PRESSURE: 141 MMHG

## 2018-11-08 VITALS — DIASTOLIC BLOOD PRESSURE: 82 MMHG | SYSTOLIC BLOOD PRESSURE: 135 MMHG

## 2018-11-08 VITALS — SYSTOLIC BLOOD PRESSURE: 126 MMHG | DIASTOLIC BLOOD PRESSURE: 69 MMHG

## 2018-11-08 LAB — PROTHROMBIN TIME: 19.2 SEC (ref 11.7–14)

## 2018-11-08 RX ADMIN — ATORVASTATIN CALCIUM SCH MG: 40 TABLET, FILM COATED ORAL at 21:52

## 2018-11-08 RX ADMIN — INSULIN LISPRO SCH UNITS: 100 INJECTION, SOLUTION INTRAVENOUS; SUBCUTANEOUS at 08:42

## 2018-11-08 RX ADMIN — MORPHINE SULFATE PRN MG: 2 INJECTION, SOLUTION INTRAMUSCULAR; INTRAVENOUS at 17:20

## 2018-11-08 RX ADMIN — LOSARTAN POTASSIUM SCH MG: 50 TABLET ORAL at 08:33

## 2018-11-08 RX ADMIN — POLYETHYLENE GLYCOL 3350 SCH GM: 17 POWDER, FOR SOLUTION ORAL at 08:32

## 2018-11-08 RX ADMIN — POLYETHYLENE GLYCOL 3350 SCH GM: 17 POWDER, FOR SOLUTION ORAL at 21:52

## 2018-11-08 RX ADMIN — Medication SCH CAP: at 21:52

## 2018-11-08 RX ADMIN — Medication SCH CAP: at 08:33

## 2018-11-08 RX ADMIN — MORPHINE SULFATE PRN MG: 2 INJECTION, SOLUTION INTRAMUSCULAR; INTRAVENOUS at 00:47

## 2018-11-08 RX ADMIN — INSULIN LISPRO SCH UNITS: 100 INJECTION, SOLUTION INTRAVENOUS; SUBCUTANEOUS at 17:14

## 2018-11-08 RX ADMIN — PREGABALIN SCH MG: 50 CAPSULE ORAL at 21:53

## 2018-11-08 RX ADMIN — INSULIN LISPRO SCH UNITS: 100 INJECTION, SOLUTION INTRAVENOUS; SUBCUTANEOUS at 08:40

## 2018-11-08 RX ADMIN — KETOROLAC TROMETHAMINE PRN MG: 15 INJECTION, SOLUTION INTRAMUSCULAR; INTRAVENOUS at 08:31

## 2018-11-08 RX ADMIN — PREGABALIN SCH MG: 50 CAPSULE ORAL at 08:33

## 2018-11-08 RX ADMIN — KETOROLAC TROMETHAMINE PRN MG: 15 INJECTION, SOLUTION INTRAMUSCULAR; INTRAVENOUS at 16:30

## 2018-11-08 RX ADMIN — MORPHINE SULFATE PRN MG: 2 INJECTION, SOLUTION INTRAMUSCULAR; INTRAVENOUS at 06:18

## 2018-11-08 RX ADMIN — SERTRALINE HYDROCHLORIDE SCH MG: 50 TABLET ORAL at 08:33

## 2018-11-08 RX ADMIN — ENOXAPARIN SODIUM SCH MG: 100 INJECTION SUBCUTANEOUS at 12:09

## 2018-11-08 RX ADMIN — CIPROFLOXACIN HYDROCHLORIDE SCH MG: 250 TABLET, FILM COATED ORAL at 21:52

## 2018-11-08 RX ADMIN — INSULIN LISPRO SCH UNITS: 100 INJECTION, SOLUTION INTRAVENOUS; SUBCUTANEOUS at 12:11

## 2018-11-08 RX ADMIN — MORPHINE SULFATE PRN MG: 2 INJECTION, SOLUTION INTRAMUSCULAR; INTRAVENOUS at 12:49

## 2018-11-08 RX ADMIN — ENOXAPARIN SODIUM SCH MG: 100 INJECTION SUBCUTANEOUS at 21:53

## 2018-11-08 RX ADMIN — INSULIN LISPRO SCH UNITS: 100 INJECTION, SOLUTION INTRAVENOUS; SUBCUTANEOUS at 17:15

## 2018-11-08 RX ADMIN — CIPROFLOXACIN HYDROCHLORIDE SCH MG: 250 TABLET, FILM COATED ORAL at 08:33

## 2018-11-08 NOTE — PDOC
PROGRESS NOTES


Chief Complaint


Chief Complaint


Abdominal Pain with possible colitis


Acute blood loss -  LGIB, hb stable


Migraines


DM - with A1c of 12.6


FV leiden


Supratherapeutic INR on warfarin


Fall


Transaminitis


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


constipation





plan:


fu with GI


on cipro, flagyl


advance to gi soft diet


monitor hb ,INR daily, warfarin restart, add lovenox bid for bridging


2FFP 11/5, vit k given


increase lantus to 50u qhs, aspart 20u tid, ssi, lantus 30u x1 now. 


talked to gi, consider dc abx. 


on miralax for constipation


dc tmr





History of Present Illness


History of Present Illness


42 yo F w/ PMHx DM, migraines, factor V Leiden deficiency (multiple PE, DVT on 

lifelong anticoagulation with coumadin) p/w abdominal pain. Onset 2 days ago, 

associated with blood in stool


Pain is cramping, colicky diffuse bilateral upper quadrant radiates to the back 

also some pain with moving her left hip. 





 


has abd pain since fall last Thursday. no diarrhea ever. 





no bloody stool since 11/5, still abd pain 11/6, 8/10, vomited 11/5 night. 11/7

, abd pain slightly better, 5/10


no BM since 11/5. 





INR 3.8 after 2 FFP 11/5


today down to 1.7 





STILL hyperglycemia,. lantus not given last night





Vitals


Vitals





Vital Signs








  Date Time  Temp Pulse Resp B/P (MAP) Pulse Ox O2 Delivery O2 Flow Rate FiO2


 


11/8/18 12:49      Room Air  


 


11/8/18 11:00 97.7 64 18 130/80 (97) 93   





 97.7       


 


11/8/18 06:55       2.0 











Physical Exam


General:  Alert, Oriented X3, Cooperative, No acute distress


Abdomen:  Normal bowel sounds, Soft, Other (Tenderness in bilateral upper 

quadrants, epigastrium and LLQ)


Extremities:  No clubbing, No cyanosis, No edema, Normal pulses, No tenderness/

swelling


Skin:  No rashes, No breakdown, No significant lesion





Labs


LABS





Laboratory Tests








Test


 11/7/18


16:55 11/7/18


20:45 11/8/18


03:45 11/8/18


07:56


 


Glucose (Fingerstick)


 209 mg/dL


(70-99) 106 mg/dL


(70-99) 


 185 mg/dL


(70-99)


 


Prothrombin Time


 


 


 19.2 SEC


(11.7-14.0) 





 


Prothromb Time International


Ratio 


 


 1.7 (0.8-1.1) 


 





 


Test


 11/8/18


11:28 


 


 





 


Glucose (Fingerstick)


 254 mg/dL


(70-99) 


 


 














Assessment and Plan


Assessmemt and Plan


Problems


Medical Problems:


(1) Coumadin toxicity


Status: Acute  











Comment


Review of Relevant


I have reviewed the following items tucker (where applicable) has been applied.


Labs





Laboratory Tests








Test


 11/6/18


16:25 11/6/18


21:19 11/7/18


03:20 11/7/18


07:29


 


Glucose (Fingerstick)


 197 mg/dL


(70-99) 203 mg/dL


(70-99) 


 253 mg/dL


(70-99)


 


White Blood Count


 


 


 5.4 x10^3/uL


(4.0-11.0) 





 


Red Blood Count


 


 


 4.56 x10^6/uL


(3.50-5.40) 





 


Hemoglobin


 


 


 13.3 g/dL


(12.0-15.5) 





 


Hematocrit


 


 


 38.9 %


(36.0-47.0) 





 


Mean Corpuscular Volume   86 fL ()  


 


Mean Corpuscular Hemoglobin   29 pg (25-35)  


 


Mean Corpuscular Hemoglobin


Concent 


 


 34 g/dL


(31-37) 





 


Red Cell Distribution Width


 


 


 13.9 %


(11.5-14.5) 





 


Platelet Count


 


 


 200 x10^3/uL


(140-400) 





 


Neutrophils (%) (Auto)   68 % (31-73)  


 


Lymphocytes (%) (Auto)   22 % (24-48)  


 


Monocytes (%) (Auto)   7 % (0-9)  


 


Eosinophils (%) (Auto)   2 % (0-3)  


 


Basophils (%) (Auto)   1 % (0-3)  


 


Neutrophils # (Auto)


 


 


 3.7 x10^3uL


(1.8-7.7) 





 


Lymphocytes # (Auto)


 


 


 1.2 x10^3/uL


(1.0-4.8) 





 


Monocytes # (Auto)


 


 


 0.4 x10^3/uL


(0.0-1.1) 





 


Eosinophils # (Auto)


 


 


 0.1 x10^3/uL


(0.0-0.7) 





 


Basophils # (Auto)


 


 


 0.0 x10^3/uL


(0.0-0.2) 





 


Prothrombin Time


 


 


 47.6 SEC


(11.7-14.0) 





 


Prothromb Time International


Ratio 


 


 5.3 (0.8-1.1) 


 





 


Sodium Level


 


 


 140 mmol/L


(136-145) 





 


Potassium Level


 


 


 4.0 mmol/L


(3.5-5.1) 





 


Chloride Level


 


 


 103 mmol/L


() 





 


Carbon Dioxide Level


 


 


 29 mmol/L


(21-32) 





 


Anion Gap   8 (6-14)  


 


Blood Urea Nitrogen


 


 


 11 mg/dL


(7-20) 





 


Creatinine


 


 


 0.6 mg/dL


(0.6-1.0) 





 


Estimated GFR


(Cockcroft-Gault) 


 


 109.6 


 





 


Glucose Level


 


 


 237 mg/dL


(70-99) 





 


Calcium Level


 


 


 9.1 mg/dL


(8.5-10.1) 





 


Test


 11/7/18


11:23 11/7/18


16:55 11/7/18


20:45 11/8/18


03:45


 


Glucose (Fingerstick)


 312 mg/dL


(70-99) 209 mg/dL


(70-99) 106 mg/dL


(70-99) 





 


Prothrombin Time


 


 


 


 19.2 SEC


(11.7-14.0)


 


Prothromb Time International


Ratio 


 


 


 1.7 (0.8-1.1) 





 


Test


 11/8/18


07:56 11/8/18


11:28 


 





 


Glucose (Fingerstick)


 185 mg/dL


(70-99) 254 mg/dL


(70-99) 


 











Laboratory Tests








Test


 11/7/18


16:55 11/7/18


20:45 11/8/18


03:45 11/8/18


07:56


 


Glucose (Fingerstick)


 209 mg/dL


(70-99) 106 mg/dL


(70-99) 


 185 mg/dL


(70-99)


 


Prothrombin Time


 


 


 19.2 SEC


(11.7-14.0) 





 


Prothromb Time International


Ratio 


 


 1.7 (0.8-1.1) 


 





 


Test


 11/8/18


11:28 


 


 





 


Glucose (Fingerstick)


 254 mg/dL


(70-99) 


 


 











Medications





Current Medications


Fentanyl Citrate (Fentanyl 2ml Vial) 50 mcg 1X  ONCE IV  Last administered on 11

/3/18at 20:24;  Start 11/3/18 at 20:15;  Stop 11/3/18 at 20:16;  Status DC


Iohexol (Omnipaque 300 Mg/ml) 100 ml STK-MED ONCE .ROUTE ;  Start 11/3/18 at 21:

08;  Stop 11/3/18 at 21:09;  Status DC


Metronidazole 100 ml @  100 mls/hr 1X  ONCE IV  Last administered on 11/3/18at 

23:04;  Start 11/3/18 at 22:30;  Stop 11/3/18 at 23:29;  Status DC


Aztreonam 1 gm/ Dextrose 50 ml @  100 mls/hr Q6HRS IV ;  Start 11/4/18 at 06:00

;  Stop 11/4/18 at 06:00;  Status DC


Sodium Chloride 1,000 ml @  1,000 mls/hr 1X  ONCE IV  Last administered on 11/3/

18at 23:04;  Start 11/3/18 at 22:30;  Stop 11/3/18 at 23:29;  Status DC


Phytonadione (Mephyton Oral Soln) 2.5 mg 1X  ONCE PO  Last administered on 11/3/

18at 23:05;  Start 11/3/18 at 22:45;  Stop 11/3/18 at 22:46;  Status DC


Morphine Sulfate (Morphine Sulfate) 4 mg PRN Q2HR  PRN IV PAIN Last 

administered on 11/4/18at 20:41;  Start 11/3/18 at 22:45;  Stop 11/4/18 at 22:44

;  Status DC


Sodium Chloride 1,000 ml @  100 mls/hr Q10H IV  Last administered on 11/4/18at 

17:18;  Start 11/3/18 at 22:45;  Stop 11/4/18 at 22:44;  Status DC


Aztreonam 1 gm/ Dextrose 50 ml @  100 mls/hr ONCE  ONCE IV  Last administered 

on 11/4/18at 00:11;  Start 11/3/18 at 23:00;  Stop 11/3/18 at 23:29;  Status DC


Ondansetron HCl (Zofran) 4 mg PRN Q6HRS  PRN IV NAUSEA/VOMITING 1ST CHOICE Last 

administered on 11/7/18at 12:18;  Start 11/4/18 at 01:00


Prochlorperazine Edisylate (Compazine) 10 mg PRN Q6HRS  PRN IV NAUSEA/VOMITING 

2ND CHOICE;  Start 11/4/18 at 01:00


Oxycodone HCl (Roxicodone) 5 mg PRN Q3HRS  PRN PO SEVERE PAIN, SEE COMMENTS 

Last administered on 11/5/18at 00:33;  Start 11/4/18 at 01:00


Ketorolac Tromethamine (Toradol 15mg Vial) 15 mg PRN Q6HRS  PRN IV MILD PAIN 

Last administered on 11/8/18at 08:31;  Start 11/4/18 at 01:00;  Stop 11/9/18 at 

00:59


Lactulose (Lactulose) 20 gm PRN Q12HR  PRN PO CONSTIPATION 2ND CHOICE;  Start 11 /4/18 at 01:00


Insulin Human Lispro (HumaLOG) 0-9 UNITS TIDWMEALS SQ  Last administered on 11/8 /18at 12:11;  Start 11/4/18 at 08:00


Dextrose (Dextrose 50%-Water Syringe) 12.5 gm PRN Q15MIN  PRN IV SEE COMMENTS;  

Start 11/4/18 at 01:00


Clonazepam (KlonoPIN) 0.5 mg DAILY PO  Last administered on 11/4/18at 09:11;  

Start 11/4/18 at 09:00;  Stop 11/5/18 at 08:12;  Status DC


Insulin Glargine (Lantus) 12 units QHS SQ  Last administered on 11/4/18at 20:48

;  Start 11/4/18 at 01:15;  Stop 11/5/18 at 12:02;  Status DC


Pregabalin (Lyrica) 50 mg BID PO  Last administered on 11/8/18at 08:33;  Start 

11/4/18 at 09:00


Sertraline HCl (Zoloft) 50 mg DAILY PO  Last administered on 11/8/18at 08:33;  

Start 11/4/18 at 09:00


Losartan Potassium (Cozaar) 100 mg DAILY PO  Last administered on 11/8/18at 08:

33;  Start 11/4/18 at 09:00


Non-Formulary Medication (Quinapril/ Hydrochlorothiazide (Quinapril-Hctz 10-

12.5 Mg Tab)) 1 each DAILY PO ;  Start 11/4/18 at 09:00;  Status UNV


Atorvastatin Calcium (Lipitor) 40 mg HS PO  Last administered on 11/7/18at 20:24

;  Start 11/4/18 at 21:00


Sumatriptan Succinate (Imitrex) 100 mg PRN Q2HR  PRN PO MIGRAINE HEADACHE;  

Start 11/4/18 at 01:00


Metronidazole 100 ml @  100 mls/hr Q8HRS IV  Last administered on 11/7/18at 14:

07;  Start 11/4/18 at 06:00;  Stop 11/7/18 at 14:58;  Status DC


Ciprofloxacin/ Dextrose 100 ml @  100 mls/hr Q12HR IV  Last administered on 11/7 /18at 08:12;  Start 11/4/18 at 01:30;  Stop 11/7/18 at 14:58;  Status DC


Lisinopril (Prinivil) 10 mg DAILY PO  Last administered on 11/5/18at 08:15;  

Start 11/4/18 at 09:00;  Stop 11/5/18 at 15:45;  Status DC


Hydrochlorothiazide (Microzide) 12.5 mg DAILY PO  Last administered on 11/8/ 18at 08:33;  Start 11/4/18 at 09:00


Iohexol (Omnipaque 300 Mg/ml) 100 ml STK-MED ONCE .ROUTE ;  Start 11/4/18 at 03:

45;  Stop 11/4/18 at 03:46;  Status DC


Insulin Glargine (Lantus) 12 units 1X  ONCE SQ  Last administered on 11/4/18at 

09:17;  Start 11/4/18 at 09:00;  Stop 11/4/18 at 09:01;  Status DC


Insulin Human Lispro (HumaLOG) 12 units 1X  ONCE SQ  Last administered on 11/4/ 18at 09:17;  Start 11/4/18 at 09:00;  Stop 11/4/18 at 09:01;  Status DC


Insulin Human Lispro (HumaLOG) 8 units 1X  ONCE SQ  Last administered on 11/4/ 18at 23:23;  Start 11/4/18 at 23:00;  Stop 11/4/18 at 23:03;  Status DC


Morphine Sulfate (Morphine Sulfate) 4 mg PRN Q2HR  PRN IV MOD - SEVERE PAIN 2ND 

CHOICE Last administered on 11/7/18at 12:12;  Start 11/5/18 at 01:00;  Stop 11/7 /18 at 13:25;  Status DC


Clonazepam (KlonoPIN) 0.5 mg PRN DAILY  PRN PO ANXIETY / AGITATION;  Start 11/5/ 18 at 08:15


Lactobacillus Rhamnosus (Culturelle) 1 cap BID PO  Last administered on 11/8/ 18at 08:33;  Start 11/5/18 at 21:00


Insulin Glargine (Lantus) 30 units QHS SQ  Last administered on 11/5/18at 20:15

;  Start 11/5/18 at 21:00;  Stop 11/6/18 at 10:27;  Status DC


Insulin Human Lispro (HumaLOG) 10 units TIDAC SQ  Last administered on 11/6/ 18at 08:15;  Start 11/5/18 at 12:00;  Stop 11/6/18 at 10:27;  Status DC


Influenza Virus Vaccine (Afluria Trivalent [id number] Syringe) 0.5 ml ONCE ONCE 

VAX IM  Last administered on 11/6/18at 08:17;  Start 11/6/18 at 09:00;  Stop 11/ 6/18 at 09:01;  Status DC


Dicyclomine HCl (Bentyl) 10 mg PRN QID  PRN PO abd pain;  Start 11/5/18 at 14:30


Ondansetron HCl (Zofran) 4 mg PRN Q6HRS  PRN IV NAUSEA/VOMITING;  Start 11/5/18 

at 15:45;  Status Cancel


Morphine Sulfate (Morphine Sulfate) 2 mg PRN Q2HR  PRN IV MOD TO SEVERE PAIN 

1ST CHOICE Last administered on 11/8/18at 12:49;  Start 11/5/18 at 15:45


Tramadol HCl (Ultram) 50 mg PRN Q6HRS  PRN PO MILD TO MODERATE PAIN Last 

administered on 11/5/18at 17:31;  Start 11/5/18 at 15:45


Docusate Sodium (Colace) 100 mg PRN DAILY  PRN PO CONSTIPATION 1ST CHOICE;  

Start 11/5/18 at 15:45


Insulin Human Lispro (HumaLOG) 4 units 1X  ONCE SQ  Last administered on 11/5/ 18at 22:26;  Start 11/5/18 at 22:00;  Stop 11/5/18 at 22:01;  Status DC


Insulin Glargine (Lantus) 45 units QHS SQ  Last administered on 11/6/18at 21:40

;  Start 11/6/18 at 21:00;  Stop 11/7/18 at 10:27;  Status DC


Insulin Human Lispro (HumaLOG) 15 units TIDAC SQ  Last administered on 11/7/ 18at 08:14;  Start 11/6/18 at 11:30;  Stop 11/7/18 at 10:27;  Status DC


Insulin Glargine (Lantus) 60 units QHS SQ ;  Start 11/7/18 at 21:00;  Stop 11/8/ 18 at 11:23;  Status DC


Insulin Human Lispro (HumaLOG) 20 units TIDAC SQ  Last administered on 11/8/ 18at 12:11;  Start 11/7/18 at 11:30


Phytonadione 10 mg/Dextrose 51 ml @  102 mls/hr 1X  ONCE IV  Last administered 

on 11/7/18at 12:13;  Start 11/7/18 at 11:00;  Stop 11/7/18 at 11:29;  Status DC


Insulin Glargine (Lantus) 30 units 1X  ONCE SQ  Last administered on 11/7/18at 

14:12;  Start 11/7/18 at 12:30;  Stop 11/7/18 at 12:38;  Status DC


Polyethylene Glycol (miraLAX PACKET) 17 gm DAILY PO  Last administered on 11/8/ 18at 08:32;  Start 11/7/18 at 16:00;  Stop 11/8/18 at 11:03;  Status DC


Ciprofloxacin (Cipro) 250 mg BID PO  Last administered on 11/8/18at 08:33;  

Start 11/7/18 at 21:00


Metronidazole (Flagyl) 500 mg Q8HRS PO  Last administered on 11/8/18at 06:17;  

Start 11/7/18 at 22:00


Polyethylene Glycol (miraLAX PACKET) 17 gm BID PO ;  Start 11/8/18 at 21:00


Insulin Glargine (Lantus) 50 units QHS SQ ;  Start 11/8/18 at 21:00


Warfarin Sodium (Coumadin) 5 mg DAILY16 PO ;  Start 11/8/18 at 16:00


Enoxaparin Sodium (Lovenox 100mg Syringe) 100 mg Q12HR SQ  Last administered on 

11/8/18at 12:09;  Start 11/8/18 at 12:00


Warfarin Sodium (Coumadin Per Physician) 1 each PRN DAILY  PRN MC SEE COMMENTS;

  Start 11/8/18 at 11:30





Active Scripts


Active


Reported


Atacand (Candesartan Cilexetil) 32 Mg Tablet 1 Tab PO DAILY


Quinapril-Hctz 10-12.5 Mg Tab (Quinapril/Hydrochlorothiazide) 1 Each Tablet 1 

Each PO DAILY


Relpax (Eletriptan Hbr) 20 Mg Tablet 20 Mg PO 


Reglan (Metoclopramide Hcl) 10 Mg Tablet 10 Mg PO TIDWMEALHC


Lyrica (Pregabalin) 50 Mg Capsule 50 Mg PO BID


Clonazepam 0.5 Mg Tablet 1 Tab PO PRN DAILY PRN


Zoloft (Sertraline Hcl) 50 Mg Tablet 1 Tab PO DAILY


Crestor (Rosuvastatin Calcium) 10 Mg Tablet 1 Tab PO DAILY


Lantus Solostar (Insulin Glargine,Hum.rec.anlog) 100 Unit/1 Ml Insuln.pen 66 

Unit SQ QHS


Vitals/I & O





Vital Sign - Last 24 Hours








 11/7/18 11/7/18 11/7/18 11/7/18





 15:00 16:57 19:00 20:00


 


Temp 97.6  97.9 





 97.6  97.9 


 


Pulse 72  70 


 


Resp 20 16 20 


 


B/P (MAP) 133/72 (92)  126/76 (93) 


 


Pulse Ox 90  90 


 


O2 Delivery Room Air Room Air Room Air Nasal Cannula


 


O2 Flow Rate    2.0


 


    





    





 11/7/18 11/7/18 11/8/18 11/8/18





 20:21 23:00 00:47 03:00


 


Temp  97.9  98.1





  97.9  98.1


 


Pulse  58  65


 


Resp 18 16 18 16


 


B/P (MAP)  158/94 (115)  109/95 (100)


 


Pulse Ox  94  94


 


O2 Delivery Room Air Room Air Nasal Cannula Room Air


 


O2 Flow Rate   2.0 


 


    





    





 11/8/18 11/8/18 11/8/18 11/8/18





 06:18 06:55 07:00 08:15


 


Temp   97.9 





   97.9 


 


Pulse   68 


 


Resp 18 18 18 


 


B/P (MAP)   141/83 (102) 


 


Pulse Ox   93 


 


O2 Delivery Nasal Cannula Nasal Cannula Room Air Room Air


 


O2 Flow Rate 2.0 2.0  


 


    





    





 11/8/18 11/8/18 11/8/18 





 08:33 11:00 12:49 


 


Temp  97.7  





  97.7  


 


Pulse 68 64  


 


Resp  18  


 


B/P (MAP) 141/83 130/80 (97)  


 


Pulse Ox  93  


 


O2 Delivery  Room Air Room Air 














Intake and Output   


 


 11/7/18 11/7/18 11/8/18





 15:00 23:00 07:00


 


Intake Total 902 ml 300 ml 


 


Balance 902 ml 300 ml 

















TERESA LESLIE MD Nov 8, 2018 14:42

## 2018-11-08 NOTE — PDOC
Subjective:


Subjective:


Tells me pain is a little better.


No stools, taking Miralax now.


Says Flagyl makes her nervous so she wants to take it with food.





Objective:


Vital Signs:





 Vital Signs








  Date Time  Temp Pulse Resp B/P (MAP) Pulse Ox O2 Delivery O2 Flow Rate FiO2


 


11/8/18 08:33  68  141/83    


 


11/8/18 08:15      Room Air  


 


11/8/18 07:00 97.9  18  93   





 97.9       


 


11/8/18 06:55       2.0 








Labs:





Laboratory Tests








Test


 11/7/18


11:23 11/7/18


16:55 11/7/18


20:45 11/8/18


03:45


 


Glucose (Fingerstick) 312 mg/dL  209 mg/dL  106 mg/dL  


 


Prothrombin Time    19.2 SEC 


 


Prothromb Time International


Ratio 


 


 


 1.7 





 


Test


 11/8/18


07:56 


 


 





 


Glucose (Fingerstick) 185 mg/dL    











PE:





GEN: NAD, was sleeping


LUNGS: clear


HEART: RRR


ABD: obese, BLQ discomfort


NEURO/PSYCH: A & O 3





A/P:


Coumadin coagulopathy - INR now 1.7


BLQ pain


Abnormal CT - "segmental wall thickening of the descending colon and sigmoid 

colon... may be due to incomplete distention"





--


D/w DR. Lantigua - [facility] tomorrow?


Outpt colonoscopy.


Try BID Miralax.











CORY GARNER Nov 8, 2018 11:03


FLORINDA BHAGAT MD Nov 8, 2018 11:37

## 2018-11-09 VITALS — DIASTOLIC BLOOD PRESSURE: 70 MMHG | SYSTOLIC BLOOD PRESSURE: 121 MMHG

## 2018-11-09 VITALS — SYSTOLIC BLOOD PRESSURE: 118 MMHG | DIASTOLIC BLOOD PRESSURE: 92 MMHG

## 2018-11-09 VITALS — DIASTOLIC BLOOD PRESSURE: 65 MMHG | SYSTOLIC BLOOD PRESSURE: 122 MMHG

## 2018-11-09 LAB
ANION GAP SERPL CALC-SCNC: 9 MMOL/L (ref 6–14)
BASOPHILS # BLD AUTO: 0 X10^3/UL (ref 0–0.2)
BASOPHILS NFR BLD: 0 % (ref 0–3)
BUN SERPL-MCNC: 13 MG/DL (ref 7–20)
CALCIUM SERPL-MCNC: 9.4 MG/DL (ref 8.5–10.1)
CHLORIDE SERPL-SCNC: 105 MMOL/L (ref 98–107)
CO2 SERPL-SCNC: 29 MMOL/L (ref 21–32)
CREAT SERPL-MCNC: 0.9 MG/DL (ref 0.6–1)
EOSINOPHIL NFR BLD: 0.1 X10^3/UL (ref 0–0.7)
EOSINOPHIL NFR BLD: 2 % (ref 0–3)
ERYTHROCYTE [DISTWIDTH] IN BLOOD BY AUTOMATED COUNT: 13.8 % (ref 11.5–14.5)
GFR SERPLBLD BASED ON 1.73 SQ M-ARVRAT: 68.7 ML/MIN
GLUCOSE SERPL-MCNC: 94 MG/DL (ref 70–99)
HCT VFR BLD CALC: 40.5 % (ref 36–47)
HGB BLD-MCNC: 14 G/DL (ref 12–15.5)
LYMPHOCYTES # BLD: 1.7 X10^3/UL (ref 1–4.8)
LYMPHOCYTES NFR BLD AUTO: 23 % (ref 24–48)
MCH RBC QN AUTO: 30 PG (ref 25–35)
MCHC RBC AUTO-ENTMCNC: 35 G/DL (ref 31–37)
MCV RBC AUTO: 86 FL (ref 79–100)
MONO #: 0.6 X10^3/UL (ref 0–1.1)
MONOCYTES NFR BLD: 9 % (ref 0–9)
NEUT #: 4.8 X10^3UL (ref 1.8–7.7)
NEUTROPHILS NFR BLD AUTO: 66 % (ref 31–73)
PLATELET # BLD AUTO: 209 X10^3/UL (ref 140–400)
POTASSIUM SERPL-SCNC: 3.6 MMOL/L (ref 3.5–5.1)
PROTHROMBIN TIME: 17.5 SEC (ref 11.7–14)
RBC # BLD AUTO: 4.72 X10^6/UL (ref 3.5–5.4)
SODIUM SERPL-SCNC: 143 MMOL/L (ref 136–145)
WBC # BLD AUTO: 7.3 X10^3/UL (ref 4–11)

## 2018-11-09 RX ADMIN — INSULIN LISPRO SCH UNITS: 100 INJECTION, SOLUTION INTRAVENOUS; SUBCUTANEOUS at 07:30

## 2018-11-09 RX ADMIN — ENOXAPARIN SODIUM SCH MG: 100 INJECTION SUBCUTANEOUS at 08:27

## 2018-11-09 RX ADMIN — INSULIN LISPRO SCH UNITS: 100 INJECTION, SOLUTION INTRAVENOUS; SUBCUTANEOUS at 12:00

## 2018-11-09 RX ADMIN — MORPHINE SULFATE PRN MG: 2 INJECTION, SOLUTION INTRAMUSCULAR; INTRAVENOUS at 03:25

## 2018-11-09 RX ADMIN — POLYETHYLENE GLYCOL 3350 SCH GM: 17 POWDER, FOR SOLUTION ORAL at 08:39

## 2018-11-09 RX ADMIN — Medication SCH CAP: at 08:28

## 2018-11-09 RX ADMIN — INSULIN LISPRO SCH UNITS: 100 INJECTION, SOLUTION INTRAVENOUS; SUBCUTANEOUS at 08:00

## 2018-11-09 RX ADMIN — CIPROFLOXACIN HYDROCHLORIDE SCH MG: 250 TABLET, FILM COATED ORAL at 08:28

## 2018-11-09 RX ADMIN — SERTRALINE HYDROCHLORIDE SCH MG: 50 TABLET ORAL at 08:28

## 2018-11-09 RX ADMIN — INSULIN LISPRO SCH UNITS: 100 INJECTION, SOLUTION INTRAVENOUS; SUBCUTANEOUS at 11:30

## 2018-11-09 RX ADMIN — PREGABALIN SCH MG: 50 CAPSULE ORAL at 08:27

## 2018-11-09 RX ADMIN — LOSARTAN POTASSIUM SCH MG: 50 TABLET ORAL at 08:40

## 2018-11-09 NOTE — PDOC
Subjective:


Subjective:


Hopeful to go home, pain the same, tolerating PO, no stools.





Objective:


Vital Signs:





 Vital Signs








  Date Time  Temp Pulse Resp B/P (MAP) Pulse Ox O2 Delivery O2 Flow Rate FiO2


 


11/9/18 08:40  69  118/92    


 


11/9/18 07:00 98.1  18  92 Room Air  





 98.1       








Labs:





Laboratory Tests








Test


 11/8/18


11:28 11/8/18


16:42 11/8/18


21:08 11/9/18


03:50


 


Glucose (Fingerstick) 254 mg/dL  252 mg/dL  161 mg/dL  


 


White Blood Count    7.3 x10^3/uL 


 


Red Blood Count    4.72 x10^6/uL 


 


Hemoglobin    14.0 g/dL 


 


Hematocrit    40.5 % 


 


Mean Corpuscular Volume    86 fL 


 


Mean Corpuscular Hemoglobin    30 pg 


 


Mean Corpuscular Hemoglobin


Concent 


 


 


 35 g/dL 





 


Red Cell Distribution Width    13.8 % 


 


Platelet Count    209 x10^3/uL 


 


Neutrophils (%) (Auto)    66 % 


 


Lymphocytes (%) (Auto)    23 % 


 


Monocytes (%) (Auto)    9 % 


 


Eosinophils (%) (Auto)    2 % 


 


Basophils (%) (Auto)    0 % 


 


Neutrophils # (Auto)    4.8 x10^3uL 


 


Lymphocytes # (Auto)    1.7 x10^3/uL 


 


Monocytes # (Auto)    0.6 x10^3/uL 


 


Eosinophils # (Auto)    0.1 x10^3/uL 


 


Basophils # (Auto)    0.0 x10^3/uL 


 


Prothrombin Time    17.5 SEC 


 


Prothromb Time International


Ratio 


 


 


 1.5 





 


Sodium Level    143 mmol/L 


 


Potassium Level    3.6 mmol/L 


 


Chloride Level    105 mmol/L 


 


Carbon Dioxide Level    29 mmol/L 


 


Anion Gap    9 


 


Blood Urea Nitrogen    13 mg/dL 


 


Creatinine    0.9 mg/dL 


 


Estimated GFR


(Cockcroft-Gault) 


 


 


 68.7 





 


Glucose Level    94 mg/dL 


 


Calcium Level    9.4 mg/dL 


 


Test


 11/9/18


08:31 


 


 





 


Glucose (Fingerstick) 83 mg/dL    











PE:





GEN: NAD, was asleep


LUNGS: CTAB


HEART: RRR


ABD: stable BLQ discomfort


NEURO/PSYCH: A & O 3





A/P:


Coumadin coagulopathy - resolved


BLQ pain, abnormal CT w/ possible "colitis"


Constipation





--


DC per primary - okay w/ GI.  Probably doesn't need to continue atbx.


Offered Relistor today - she declined.


Encouraged to continue Miralax BID (adjust dose as needed) especially if to 

continue pain meds.  Try Dulcolax.


Follow-up for outpt colonoscopy - our office will contact.











CORY GARNER Nov 9, 2018 10:47

## 2018-11-09 NOTE — PDOC3
Discharge Summary [facility]


Date of Admission:  Nov 3, 2018


Discharge Date:  Nov 9, 2018


Admitting Diagnosis


 bdominal Pain with possible colitis


Acute blood loss -  LGIB 2/2 supratherapeutic INR with warfarin likely, hb 

stable


Migraines


DM - with A1c of 12.6


FV leiden


Supratherapeutic INR on warfarin


Fall


Transaminitis


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


constipation


Final Diagnosis





CONSULTS


gi


Brief Hospital Course


Ms. Reveles  is a 43 old F, on warfarin for h/o DVT, PE with five leiden 

mutation.


came for abd pain , post fall last Thursday. [facility] since INR 10. 


she then has rectal bleeding for 1 d, no diarrhea, some nausea, cont severe abd 

pain.  CT here showed possible colitis with some thickening colon walls. got 

flagyl, and cipro.


pt has no BM here , rectal bleeding stopped after admission, INR better after 

FFP, vitK. now low as 1.5. 


lovenox bid and warfarin started.


pt still has 5/10 abd pain, diffuse, decreased bs, eats ok. nausea last night. 

wants to go home.


i talked to GI, ok to dc, wo abx for home given less likely colitis.


pt understands to do lovenox sq bid, warfarin daily till INR 2-3.


dc time 35min. 





General:  Alert, Oriented X3, Cooperative, No acute distress


Abdomen: decreased bowel sounds, Soft, mild tenderness lower abd. 


Extremities:  No clubbing, No cyanosis, No edema, Normal pulses, No tenderness/

swelling


Skin:  No rashes, No breakdown, No significant lesion


Disposition


home


CONDITION AT DISCHARGE:  Improved


Scheduled


Candesartan Cilexetil (Atacand), 1 TAB PO DAILY, (Reported)


Enoxaparin Sodium (Enoxaparin Sodium), 100 MG SQ Q12HR


Insulin Glargine,Hum.rec.anlog (Lantus Solostar), 66 UNIT SQ QHS, (Reported)


Metoclopramide Hcl (Reglan), 10 MG PO [id number], (Reported)


Polyethylene Glycol 3350 (Polyethylene Glycol 3350), 17 GM PO BID


Pregabalin (Lyrica), 50 MG PO BID, (Reported)


Quinapril/Hydrochlorothiazide (Quinapril-Hctz 10-12.5 Mg Tab), 1 EACH PO DAILY, 

(Reported)


Rosuvastatin Calcium (Crestor), 1 TAB PO DAILY, (Reported)


Sertraline Hcl (Zoloft), 1 TAB PO DAILY, (Reported)





Scheduled PRN


Clonazepam (Clonazepam), 1 TAB PO PRN DAILY PRN for ANXIETY / AGITATION, (

Reported)





Miscellaneous Medications


Eletriptan Hbr (Relpax), 20 MG PO, (Reported)











TERESA LESLIE MD Nov 9, 2018 13:13

## 2018-11-20 ENCOUNTER — HOSPITAL ENCOUNTER (INPATIENT)
Dept: HOSPITAL 61 - ER | Age: 43
LOS: 3 days | Discharge: HOME | DRG: 378 | End: 2018-11-23
Attending: INTERNAL MEDICINE | Admitting: INTERNAL MEDICINE
Payer: COMMERCIAL

## 2018-11-20 VITALS — SYSTOLIC BLOOD PRESSURE: 144 MMHG | DIASTOLIC BLOOD PRESSURE: 90 MMHG

## 2018-11-20 VITALS — HEIGHT: 67 IN | WEIGHT: 247 LBS | BODY MASS INDEX: 38.77 KG/M2

## 2018-11-20 DIAGNOSIS — Z79.4: ICD-10-CM

## 2018-11-20 DIAGNOSIS — Z79.01: ICD-10-CM

## 2018-11-20 DIAGNOSIS — Z86.711: ICD-10-CM

## 2018-11-20 DIAGNOSIS — R58: ICD-10-CM

## 2018-11-20 DIAGNOSIS — Z79.899: ICD-10-CM

## 2018-11-20 DIAGNOSIS — K76.0: ICD-10-CM

## 2018-11-20 DIAGNOSIS — Z88.8: ICD-10-CM

## 2018-11-20 DIAGNOSIS — K92.1: Primary | ICD-10-CM

## 2018-11-20 DIAGNOSIS — E11.65: ICD-10-CM

## 2018-11-20 DIAGNOSIS — E66.01: ICD-10-CM

## 2018-11-20 DIAGNOSIS — R04.2: ICD-10-CM

## 2018-11-20 DIAGNOSIS — G43.909: ICD-10-CM

## 2018-11-20 DIAGNOSIS — E88.81: ICD-10-CM

## 2018-11-20 DIAGNOSIS — Z90.49: ICD-10-CM

## 2018-11-20 DIAGNOSIS — Z86.718: ICD-10-CM

## 2018-11-20 DIAGNOSIS — K59.00: ICD-10-CM

## 2018-11-20 DIAGNOSIS — Z98.51: ICD-10-CM

## 2018-11-20 DIAGNOSIS — Z83.3: ICD-10-CM

## 2018-11-20 DIAGNOSIS — Z88.0: ICD-10-CM

## 2018-11-20 DIAGNOSIS — I10: ICD-10-CM

## 2018-11-20 DIAGNOSIS — D68.51: ICD-10-CM

## 2018-11-20 DIAGNOSIS — E78.5: ICD-10-CM

## 2018-11-20 LAB
ALBUMIN SERPL-MCNC: 3.5 G/DL (ref 3.4–5)
ALBUMIN/GLOB SERPL: 0.7 {RATIO} (ref 1–1.7)
ALP SERPL-CCNC: 77 U/L (ref 46–116)
ALT SERPL-CCNC: 40 U/L (ref 14–59)
ANION GAP SERPL CALC-SCNC: 12 MMOL/L (ref 6–14)
APTT BLD: 29 SEC (ref 24–38)
APTT PPP: YELLOW S
AST SERPL-CCNC: 18 U/L (ref 15–37)
BACTERIA #/AREA URNS HPF: (no result) /HPF
BASOPHILS # BLD AUTO: 0.1 X10^3/UL (ref 0–0.2)
BASOPHILS NFR BLD: 1 % (ref 0–3)
BILIRUB SERPL-MCNC: 0.4 MG/DL (ref 0.2–1)
BILIRUB UR QL STRIP: NEGATIVE
BUN SERPL-MCNC: 12 MG/DL (ref 7–20)
BUN/CREAT SERPL: 15 (ref 6–20)
CALCIUM SERPL-MCNC: 9.6 MG/DL (ref 8.5–10.1)
CHLORIDE SERPL-SCNC: 96 MMOL/L (ref 98–107)
CO2 SERPL-SCNC: 25 MMOL/L (ref 21–32)
CREAT SERPL-MCNC: 0.8 MG/DL (ref 0.6–1)
EOSINOPHIL NFR BLD: 0.1 X10^3/UL (ref 0–0.7)
EOSINOPHIL NFR BLD: 2 % (ref 0–3)
ERYTHROCYTE [DISTWIDTH] IN BLOOD BY AUTOMATED COUNT: 13.6 % (ref 11.5–14.5)
FECAL OB PT: NEGATIVE
FIBRINOGEN PPP-MCNC: CLEAR MG/DL
GFR SERPLBLD BASED ON 1.73 SQ M-ARVRAT: 78.7 ML/MIN
GLOBULIN SER-MCNC: 5 G/DL (ref 2.2–3.8)
GLUCOSE SERPL-MCNC: 500 MG/DL (ref 70–99)
HCT VFR BLD CALC: 41.2 % (ref 36–47)
HGB BLD-MCNC: 14.3 G/DL (ref 12–15.5)
LYMPHOCYTES # BLD: 2.1 X10^3/UL (ref 1–4.8)
LYMPHOCYTES NFR BLD AUTO: 29 % (ref 24–48)
MCH RBC QN AUTO: 29 PG (ref 25–35)
MCHC RBC AUTO-ENTMCNC: 35 G/DL (ref 31–37)
MCV RBC AUTO: 83 FL (ref 79–100)
MONO #: 0.5 X10^3/UL (ref 0–1.1)
MONOCYTES NFR BLD: 7 % (ref 0–9)
NEUT #: 4.5 X10^3UL (ref 1.8–7.7)
NEUTROPHILS NFR BLD AUTO: 61 % (ref 31–73)
NITRITE UR QL STRIP: NEGATIVE
PH UR STRIP: 5.5 [PH]
PLATELET # BLD AUTO: 267 X10^3/UL (ref 140–400)
POTASSIUM SERPL-SCNC: 3.8 MMOL/L (ref 3.5–5.1)
PROT SERPL-MCNC: 8.5 G/DL (ref 6.4–8.2)
PROT UR STRIP-MCNC: NEGATIVE MG/DL
PROTHROMBIN TIME: 13 SEC (ref 11.7–14)
RBC # BLD AUTO: 4.94 X10^6/UL (ref 3.5–5.4)
RBC #/AREA URNS HPF: (no result) /HPF (ref 0–2)
SODIUM SERPL-SCNC: 133 MMOL/L (ref 136–145)
SQUAMOUS #/AREA URNS LPF: (no result) /LPF
UROBILINOGEN UR-MCNC: 0.2 MG/DL
WBC # BLD AUTO: 7.3 X10^3/UL (ref 4–11)
WBC #/AREA URNS HPF: (no result) /HPF (ref 0–4)

## 2018-11-20 PROCEDURE — 81025 URINE PREGNANCY TEST: CPT

## 2018-11-20 PROCEDURE — 74176 CT ABD & PELVIS W/O CONTRAST: CPT

## 2018-11-20 PROCEDURE — 87086 URINE CULTURE/COLONY COUNT: CPT

## 2018-11-20 PROCEDURE — C9113 INJ PANTOPRAZOLE SODIUM, VIA: HCPCS

## 2018-11-20 PROCEDURE — 80048 BASIC METABOLIC PNL TOTAL CA: CPT

## 2018-11-20 PROCEDURE — 85730 THROMBOPLASTIN TIME PARTIAL: CPT

## 2018-11-20 PROCEDURE — 82962 GLUCOSE BLOOD TEST: CPT

## 2018-11-20 PROCEDURE — 82274 ASSAY TEST FOR BLOOD FECAL: CPT

## 2018-11-20 PROCEDURE — 36415 COLL VENOUS BLD VENIPUNCTURE: CPT

## 2018-11-20 PROCEDURE — 86900 BLOOD TYPING SEROLOGIC ABO: CPT

## 2018-11-20 PROCEDURE — 85025 COMPLETE CBC W/AUTO DIFF WBC: CPT

## 2018-11-20 PROCEDURE — 86850 RBC ANTIBODY SCREEN: CPT

## 2018-11-20 PROCEDURE — 81001 URINALYSIS AUTO W/SCOPE: CPT

## 2018-11-20 PROCEDURE — 96361 HYDRATE IV INFUSION ADD-ON: CPT

## 2018-11-20 PROCEDURE — 86901 BLOOD TYPING SEROLOGIC RH(D): CPT

## 2018-11-20 PROCEDURE — 74174 CTA ABD&PLVS W/CONTRAST: CPT

## 2018-11-20 PROCEDURE — 96374 THER/PROPH/DIAG INJ IV PUSH: CPT

## 2018-11-20 PROCEDURE — 80053 COMPREHEN METABOLIC PANEL: CPT

## 2018-11-20 PROCEDURE — 85610 PROTHROMBIN TIME: CPT

## 2018-11-20 NOTE — PHYS DOC
Past Medical History


Past Medical History:  Diabetes-Type II, DVT, Hypertension


Additional Past Medical Histor:  PE/DVT, HEART MURMUR, FACTOR FIVE CLOTTING 

DISORDER


Past Surgical History:  Cholecystectomy, Tonsillectomy, Tubal ligation


Additional Past Surgical Histo:  OVARIAN CYST,BILAT EARS,LEAP, LYMP NODES, T&A


Alcohol Use:  None


Drug Use:  None





Adult General


Chief Complaint


Chief Complaint:  RECTAL BLEED





HPI


HPI





Patient is a 43  year old [female] who presents with GI bleeding. Patient 

reports bright red blood both in the stool in the emesis. This has been going 

on for the past day or so. Patient also notes some diffuse abdominal pain that 

is both sharp and crampy. Patient had similar discomfort when she was recently 

admitted for another GI bleed and an elevated INR. Patient is on Coumadin due 

to the fact or 5 Leiden as well as previous blood clots. Patient denies any 

chest pain or difficulty breathing. No fevers. Denies any vaginal bleeding or 

dysuria. During her recent admission with the supratherapeutic INR, patient did 

not have any kind of endoscopy performed. []





Review of Systems


Review of Systems





Constitutional: Denies fever or chills []


Eyes: Denies change in visual acuity, redness, or eye pain []


HENT: Denies nasal congestion or sore throat []


Respiratory: Denies cough or shortness of breath []


Cardiovascular: No chest pain or palpitations[]


GI: See history of present illness[]


: Denies dysuria or hematuria []


Musculoskeletal: Denies back pain or joint pain []


Integument: Denies rash or skin lesions []


Neurologic: Denies headache, focal weakness or sensory changes []


Endocrine: Denies polyuria or polydipsia []





All other systems were reviewed and found to be within normal limits, except as 

documented in this note.





Current Medications


Current Medications





Current Medications








 Medications


  (Trade)  Dose


 Ordered  Sig/Lila  Start Time


 Stop Time Status Last Admin


Dose Admin


 


 Hyoscyamine


  (Anaspaz)  0.125 mg  1X  ONCE  11/20/18 22:45


 11/20/18 22:46 DC 11/20/18 22:36


0.125 MG


 


 Pantoprazole


 Sodium


  (PROTONIX VIAL


 for IV PUSH)  80 mg  1X  ONCE  11/20/18 22:30


 11/20/18 22:31 DC 11/20/18 22:36


80 MG


 


 Sodium Chloride  500 ml @ 


 500 mls/hr  Q1H  11/20/18 21:30


 11/20/18 22:00 DC 11/20/18 21:30


500 MLS/HR











Allergies


Allergies





Allergies








Coded Allergies Type Severity Reaction Last Updated Verified


 


  acetaminophen Allergy Severe THROAT SWELLS 3/2/16 Yes


 


  propoxyphene Allergy Severe THROAT SWELLS 3/2/16 Yes


 


  Penicillins Allergy Intermediate HIVES 3/2/16 Yes


 


  orange Allergy Intermediate  11/8/18 Yes


 


  vancomycin Allergy Intermediate  11/7/18 Yes











Physical Exam


Physical Exam





Constitutional: Well developed, well nourished, no acute distress, non-toxic 

appearance. []


HENT: Normocephalic, atraumatic, bilateral external ears normal, oropharynx 

moist, no oral exudates, nose normal. []


Eyes: PERRLA, EOMI, conjunctiva normal, no discharge. [] 


Neck: Normal range of motion, no tenderness, supple, no stridor. [] 


Cardiovascular:Heart rate regular rhythm, no murmur []


Lungs & Thorax:  Bilateral breath sounds clear to auscultation []


Abdomen: Bowel sounds normal, soft, diffuse tenderness, no rebound, no guarding

, no rigidity, no masses, no pulsatile masses. [] 


Skin: Warm, dry, no erythema, no rash. [] 


Back: No tenderness, no CVA tenderness. [] 


Extremities: No tenderness, no cyanosis, no clubbing, ROM intact, no edema. [] 


Neurologic: Alert and oriented X 3, normal motor function, normal sensory 

function, no focal deficits noted. []


Psychologic: Affect normal, judgement normal, mood normal. []





Current Patient Data


Vital Signs





 Vital Signs








  Date Time  Temp Pulse Resp B/P (MAP) Pulse Ox O2 Delivery O2 Flow Rate FiO2


 


11/20/18 20:53 97.7  16 155/105 (122) 97   





 97.7       








Lab Values





 Laboratory Tests








Test


 11/20/18


21:10 11/20/18


21:29 11/20/18


21:32 11/20/18


21:40


 


Urine Collection Type Unknown     


 


Urine Color Yellow     


 


Urine Clarity Clear     


 


Urine pH 5.5     


 


Urine Specific Gravity >=1.030     


 


Urine Protein


 Negative mg/dL


(NEG-TRACE) 


 


 





 


Urine Glucose (UA)


 >=1000 mg/dL


(NEG) 


 


 





 


Urine Ketones (Stick)


 Negative mg/dL


(NEG) 


 


 





 


Urine Blood


 Negative (NEG)


 


 


 





 


Urine Nitrite


 Negative (NEG)


 


 


 





 


Urine Bilirubin


 Negative (NEG)


 


 


 





 


Urine Urobilinogen Dipstick


 0.2 mg/dL (0.2


mg/dL) 


 


 





 


Urine Leukocyte Esterase


 Negative (NEG)


 


 


 





 


Urine RBC


 Occ /HPF (0-2)


 


 


 





 


Urine WBC


 1-4 /HPF (0-4)


 


 


 





 


Urine Squamous Epithelial


Cells Few /LPF  


 


 


 





 


Urine Bacteria


 Moderate /HPF


(0-FEW) 


 


 





 


Stool Occult Blood


 


 Negative (NEG)


 


 





 


POC Urine HCG, Qualitative


 


 


 Hcg negative


(Negative) 





 


White Blood Count


 


 


 


 7.3 x10^3/uL


(4.0-11.0)


 


Red Blood Count


 


 


 


 4.94 x10^6/uL


(3.50-5.40)


 


Hemoglobin


 


 


 


 14.3 g/dL


(12.0-15.5)


 


Hematocrit


 


 


 


 41.2 %


(36.0-47.0)


 


Mean Corpuscular Volume


 


 


 


 83 fL ()





 


Mean Corpuscular Hemoglobin    29 pg (25-35)  


 


Mean Corpuscular Hemoglobin


Concent 


 


 


 35 g/dL


(31-37)


 


Red Cell Distribution Width


 


 


 


 13.6 %


(11.5-14.5)


 


Platelet Count


 


 


 


 267 x10^3/uL


(140-400)


 


Neutrophils (%) (Auto)    61 % (31-73)  


 


Lymphocytes (%) (Auto)    29 % (24-48)  


 


Monocytes (%) (Auto)    7 % (0-9)  


 


Eosinophils (%) (Auto)    2 % (0-3)  


 


Basophils (%) (Auto)    1 % (0-3)  


 


Neutrophils # (Auto)


 


 


 


 4.5 x10^3uL


(1.8-7.7)


 


Lymphocytes # (Auto)


 


 


 


 2.1 x10^3/uL


(1.0-4.8)


 


Monocytes # (Auto)


 


 


 


 0.5 x10^3/uL


(0.0-1.1)


 


Eosinophils # (Auto)


 


 


 


 0.1 x10^3/uL


(0.0-0.7)


 


Basophils # (Auto)


 


 


 


 0.1 x10^3/uL


(0.0-0.2)


 


Prothrombin Time


 


 


 


 13.0 SEC


(11.7-14.0)


 


Prothrombin Time INR    1.0 (0.8-1.1)  


 


PTT


 


 


 


 29 SEC (24-38)





 


Sodium Level


 


 


 


 133 mmol/L


(136-145)  L


 


Potassium Level


 


 


 


 3.8 mmol/L


(3.5-5.1)


 


Chloride Level


 


 


 


 96 mmol/L


()  L


 


Carbon Dioxide Level


 


 


 


 25 mmol/L


(21-32)


 


Anion Gap    12 (6-14)  


 


Blood Urea Nitrogen


 


 


 


 12 mg/dL


(7-20)


 


Creatinine


 


 


 


 0.8 mg/dL


(0.6-1.0)


 


Estimated GFR


(Cockcroft-Gault) 


 


 


 78.7  





 


BUN/Creatinine Ratio    15 (6-20)  


 


Glucose Level


 


 


 


 500 mg/dL


(70-99)  *H


 


Calcium Level


 


 


 


 9.6 mg/dL


(8.5-10.1)


 


Total Bilirubin


 


 


 


 0.4 mg/dL


(0.2-1.0)


 


Aspartate Amino Transferase


(AST) 


 


 


 18 U/L (15-37)





 


Alanine Aminotransferase (ALT)


 


 


 


 40 U/L (14-59)





 


Alkaline Phosphatase


 


 


 


 77 U/L


()


 


Total Protein


 


 


 


 8.5 g/dL


(6.4-8.2)  H


 


Albumin


 


 


 


 3.5 g/dL


(3.4-5.0)


 


Albumin/Globulin Ratio


 


 


 


 0.7 (1.0-1.7)


L





 Laboratory Tests


11/20/18 21:40








 Laboratory Tests


11/20/18 21:40














EKG


EKG


[]





Radiology/Procedures


Radiology/Procedures


CT scan of the abdomen and pelvis did not show any acute features.[]





Course & Med Decision Making


Course & Med Decision Making


Pertinent Labs and Imaging studies reviewed. (See chart for details)





ED course: Patient arrived, was placed in bed, in tolerate exam well. Rectal 

exam was performed with chaperone and no external lesions and no gross bleeding 

was noted. Patient's elevated glucose was noted, was started on IV fluids. 

After the return of the lab and imaging findings, these were discussed with the 

patient, and plan was arrived at with patient. All questions were answered.





Medical decision making: There does not appear to be significant anemia, no 

emergent surgical pathology, no evidence of diabetic ketoacidosis, no 

supratherapeutic INR. We will admit the patient for serial labs and potentially 

an EGD in the morning.[]





Dragon Disclaimer


Dragon Disclaimer


This electronic medical record was generated, in whole or in part, using a 

voice recognition dictation system.





Departure


Departure


Impression:  


 Primary Impression:  


 GI bleed


 Additional Impression:  


 Hyperglycemia


Disposition:  09 ADMITTED AS INPATIENT


Admitting Physician:  Andre Gaspar


Condition:  STABLE


Referrals:  


UNKNOWN PCP NAME (PCP)





Problem Qualifiers











PHILIP WAGNER DO Nov 20, 2018 23:07

## 2018-11-20 NOTE — RAD
PQRS Compliance statement:

 

One or more of the following individualized dose reduction techniques were

utilized for this examination:

1. Automated exposure control.

2. Adjustment of the mA and/or kV according to patient size.

3. Use of iterative reconstruction technique.

 

Indication:blood in stool and vomit, low abd pain, non contrast per order,

prior sent from earlier this month

 

TECHNIQUE: CT abdomen and pelvis without IV contrast with multiplanar 

reformats.

 

COMPARISON: 11/3/2018

 

FINDINGS:

Limited evaluation of solid abdominal and pelvic organs due to lack of IV 

contrast. Heart is normal in size. No pericardial or pleural effusion. 

Clear lung bases. Noncontrast appearance of the liver, spleen, pancreas, 

adrenals within normal limits. Status post cholecystectomy. No 

nephrolithiasis or hydronephrosis. No enlarged retroperitoneal or pelvic 

adenopathy. No free pelvic fluid or ascites. Small fat-containing left 

inguinal hernia. No bowel obstruction. Anteverted uterus. Urinary bladder 

demonstrates no radiopaque stones. Normal appendix. No pneumoperitoneum. 

No suspicious bony lesion.

 

IMPRESSION:

Limited evaluation of solid abdominal and pelvic organs due to lack of IV 

contrast.

No acute findings.

 

Electronically signed by: Skip Bender DO (11/20/2018 10:45 PM) [facility]

## 2018-11-21 VITALS — SYSTOLIC BLOOD PRESSURE: 147 MMHG | DIASTOLIC BLOOD PRESSURE: 92 MMHG

## 2018-11-21 VITALS — DIASTOLIC BLOOD PRESSURE: 62 MMHG | SYSTOLIC BLOOD PRESSURE: 105 MMHG

## 2018-11-21 VITALS — DIASTOLIC BLOOD PRESSURE: 54 MMHG | SYSTOLIC BLOOD PRESSURE: 97 MMHG

## 2018-11-21 VITALS — SYSTOLIC BLOOD PRESSURE: 128 MMHG | DIASTOLIC BLOOD PRESSURE: 80 MMHG

## 2018-11-21 VITALS — SYSTOLIC BLOOD PRESSURE: 128 MMHG | DIASTOLIC BLOOD PRESSURE: 85 MMHG

## 2018-11-21 VITALS — DIASTOLIC BLOOD PRESSURE: 87 MMHG | SYSTOLIC BLOOD PRESSURE: 130 MMHG

## 2018-11-21 LAB
ALBUMIN SERPL-MCNC: 3.1 G/DL (ref 3.4–5)
ALBUMIN/GLOB SERPL: 0.7 {RATIO} (ref 1–1.7)
ALP SERPL-CCNC: 72 U/L (ref 46–116)
ALT SERPL-CCNC: 37 U/L (ref 14–59)
ANION GAP SERPL CALC-SCNC: 9 MMOL/L (ref 6–14)
AST SERPL-CCNC: 15 U/L (ref 15–37)
BASOPHILS # BLD AUTO: 0 X10^3/UL (ref 0–0.2)
BASOPHILS NFR BLD: 1 % (ref 0–3)
BILIRUB SERPL-MCNC: 0.7 MG/DL (ref 0.2–1)
BUN SERPL-MCNC: 10 MG/DL (ref 7–20)
BUN/CREAT SERPL: 14 (ref 6–20)
CALCIUM SERPL-MCNC: 8.9 MG/DL (ref 8.5–10.1)
CHLORIDE SERPL-SCNC: 100 MMOL/L (ref 98–107)
CO2 SERPL-SCNC: 25 MMOL/L (ref 21–32)
CREAT SERPL-MCNC: 0.7 MG/DL (ref 0.6–1)
EOSINOPHIL NFR BLD: 0.1 X10^3/UL (ref 0–0.7)
EOSINOPHIL NFR BLD: 2 % (ref 0–3)
ERYTHROCYTE [DISTWIDTH] IN BLOOD BY AUTOMATED COUNT: 13.2 % (ref 11.5–14.5)
GFR SERPLBLD BASED ON 1.73 SQ M-ARVRAT: 91.8 ML/MIN
GLOBULIN SER-MCNC: 4.2 G/DL (ref 2.2–3.8)
GLUCOSE SERPL-MCNC: 353 MG/DL (ref 70–99)
HCT VFR BLD CALC: 36.8 % (ref 36–47)
HGB BLD-MCNC: 13 G/DL (ref 12–15.5)
LYMPHOCYTES # BLD: 2.1 X10^3/UL (ref 1–4.8)
LYMPHOCYTES NFR BLD AUTO: 33 % (ref 24–48)
MCH RBC QN AUTO: 29 PG (ref 25–35)
MCHC RBC AUTO-ENTMCNC: 35 G/DL (ref 31–37)
MCV RBC AUTO: 83 FL (ref 79–100)
MONO #: 0.5 X10^3/UL (ref 0–1.1)
MONOCYTES NFR BLD: 8 % (ref 0–9)
NEUT #: 3.6 X10^3UL (ref 1.8–7.7)
NEUTROPHILS NFR BLD AUTO: 57 % (ref 31–73)
PLATELET # BLD AUTO: 239 X10^3/UL (ref 140–400)
POTASSIUM SERPL-SCNC: 3.6 MMOL/L (ref 3.5–5.1)
PROT SERPL-MCNC: 7.3 G/DL (ref 6.4–8.2)
RBC # BLD AUTO: 4.43 X10^6/UL (ref 3.5–5.4)
SODIUM SERPL-SCNC: 134 MMOL/L (ref 136–145)
WBC # BLD AUTO: 6.4 X10^3/UL (ref 4–11)

## 2018-11-21 RX ADMIN — MORPHINE SULFATE PRN MG: 2 INJECTION, SOLUTION INTRAMUSCULAR; INTRAVENOUS at 10:43

## 2018-11-21 RX ADMIN — MORPHINE SULFATE PRN MG: 2 INJECTION, SOLUTION INTRAMUSCULAR; INTRAVENOUS at 00:37

## 2018-11-21 RX ADMIN — SERTRALINE HYDROCHLORIDE SCH MG: 50 TABLET ORAL at 09:00

## 2018-11-21 RX ADMIN — METOCLOPRAMIDE HYDROCHLORIDE SCH MG: 10 TABLET ORAL at 12:00

## 2018-11-21 RX ADMIN — PANTOPRAZOLE SODIUM SCH MG: 40 TABLET, DELAYED RELEASE ORAL at 13:18

## 2018-11-21 RX ADMIN — PREGABALIN SCH MG: 50 CAPSULE ORAL at 20:31

## 2018-11-21 RX ADMIN — PREGABALIN SCH MG: 50 CAPSULE ORAL at 09:00

## 2018-11-21 RX ADMIN — MORPHINE SULFATE PRN MG: 2 INJECTION, SOLUTION INTRAMUSCULAR; INTRAVENOUS at 14:47

## 2018-11-21 RX ADMIN — ATORVASTATIN CALCIUM SCH MG: 40 TABLET, FILM COATED ORAL at 20:31

## 2018-11-21 RX ADMIN — LISINOPRIL SCH MG: 10 TABLET ORAL at 09:00

## 2018-11-21 RX ADMIN — METOCLOPRAMIDE HYDROCHLORIDE SCH MG: 10 TABLET ORAL at 08:00

## 2018-11-21 RX ADMIN — INSULIN LISPRO SCH UNITS: 100 INJECTION, SOLUTION INTRAVENOUS; SUBCUTANEOUS at 14:26

## 2018-11-21 RX ADMIN — MORPHINE SULFATE PRN MG: 2 INJECTION, SOLUTION INTRAMUSCULAR; INTRAVENOUS at 06:26

## 2018-11-21 RX ADMIN — INSULIN LISPRO SCH UNITS: 100 INJECTION, SOLUTION INTRAVENOUS; SUBCUTANEOUS at 14:27

## 2018-11-21 RX ADMIN — BACITRACIN SCH MLS/HR: 5000 INJECTION, POWDER, FOR SOLUTION INTRAMUSCULAR at 05:38

## 2018-11-21 RX ADMIN — MORPHINE SULFATE PRN MG: 2 INJECTION, SOLUTION INTRAMUSCULAR; INTRAVENOUS at 19:27

## 2018-11-21 RX ADMIN — BACITRACIN SCH MLS/HR: 5000 INJECTION, POWDER, FOR SOLUTION INTRAMUSCULAR at 13:18

## 2018-11-21 RX ADMIN — BACITRACIN SCH MLS/HR: 5000 INJECTION, POWDER, FOR SOLUTION INTRAMUSCULAR at 00:25

## 2018-11-21 RX ADMIN — INSULIN LISPRO SCH UNITS: 100 INJECTION, SOLUTION INTRAVENOUS; SUBCUTANEOUS at 17:04

## 2018-11-21 NOTE — RAD
CTA of the abdomen and pelvis with contrast, 11/21/2018:

 

HISTORY: Possible bowel ischemia, abdominal pain

 

Multidetector CT imaging was performed following an IV bolus injection of 

iodinated contrast material. Multiplanar reconstructions were produced 

including MIP images as well as 3-D volume rendered reconstructions of the

major arteries.

 

There is residual GI tract contrast in the distal small bowel and colon 

from yesterday's CT exam.

 

No significant aortoiliac calcific plaquing is present. The aorta is of 

normal caliber. The origins of the celiac and superior mesenteric arteries

from the aorta are widely patent. There are single widely patent renal 

arteries bilaterally. The common iliac, internal iliac and external iliac 

arteries are unremarkable.

 

The liver is enlarged measuring 23 cm in craniocaudad extent. The spleen 

is at the upper limits of normal in size measuring 13 cm in craniocaudad 

extent. The gallbladder is surgically absent. No pancreatic abnormality is

seen. The kidneys and adrenal glands are unremarkable. The bowel loops are

not dilated. No free air or free fluid is evident in the abdomen or 

pelvis.

 

IMPRESSION:

1. Normal CTA of the abdomen and pelvis.

2. Hepatomegaly

 

 

PQRS Compliance Statement:

 

One or more of the following individualized dose reduction techniques were

utilized for this examination:  

1. Automated exposure control  

2. Adjustment of the mA and/or kV according to patient size  

3. Use of iterative reconstruction technique

 

 

Electronically signed by: Rick Moritz, MD (11/21/2018 4:10 PM) [facility]

## 2018-11-21 NOTE — PDOC2
GI CONSULT


Reason For Consult:


Abd pain





HPI:


HPI:


44 y/o female who was recently admitted w/ Coumadin coagulopathy and lower abd 

pain w/ h/o fall down the stairs.  CT on 11/3/18 showed segmental wall 

thickening of the descending colon and sigmoid colon possibly due to incomplete 

distention or colitis.  Had no stools or bleeding during admission, was treated 

w/ antibiotics, and discharged on 11/9 w/ recommendation to follow-up with our 

office for outpatient colonoscopy.  She reports that at home the pain (located 

"across the lower section") never completely went away but was better.  Was 

eating and stooling without issue.  Then on Sunday pain was suddenly a lot 

worse ("like a million little Dewayne Kreugers in there") and made her "double 

over" and cry.  No precipitating events.  Pain might get a little better when 

she's sitting and leaning forward.  She had some loose stools (typical for her 

since cholecystectomy) w/ some red blood "wrapped around."  Pain was worse the 

next day - she had two similar stools that day and vomited after eating.  Felt 

"terrible" yesterday, so decided to come back to the hospital.  Last stool and 

emesis occurred on Tuesday - stools still appeared to be wrapped with red blood 

and emesis had a pink tinge.  Tells me she doesn't feel any better now but also 

says she's hungry.





Denies GERD, dysphagia, chronic n/v (though mentions she sometimes feels sick 

when her glucose gets really high or really low), diarrhea, constipation, 

weight loss, or melena.  No previous EGD or colonoscopy.  S/p cholecystectomy 

for gallstones.  No liver or pancreas history.  H/o DM on insulin, says glucose 

normally runs between 200-280 at home ("that's good for me and that's when I 

feel the best").  H/o Factor V Leiden on chronic Coumadin.  No NSAIDs.  Summary 

list includes Reglan 10mg and is also ordered TID here - I confirmed that she 

does NOT take this at home.  She also had a CT A/P in 2016 for "severe lower 

abdominal pain" that was unrevealing.





This time, labs include normal WBC, normal Hgb, normal INR, normal BUN, normal 

LFTs, and hemoccult was negative.  Glucose was 548.  CT A/P w/o contrast was 

unrevealing.


D/w Dr. Lantigua - plans to hold Coumadin today but hopefully resume tomorrow, also 

for CT angio and clear liquids.





PMH:


PMH:


HTN, IDDM, HLD, Factor V Leiden w/ previous PE and DVT on chronic Coumadin, 

cholecystectomy, tubal ligation, tonsillectomy, ear surgery, bilateral ovarian 

cyst removal, thyroid biopsy





FH:


Family History:  No pertinent hx (denies GI cancers)





Social History:


Smoke:  No


ALCOHOL:  none


Drugs:  None





ROS:





GEN: Denies fevers, chills, sweats


HEENT: Denies blurred vision, sore throat


CV: Denies chest pain


RESP: Denies shortness of air, cough


GI: Per HPI


: Denies hematuria, dysuria


ENDO: Denies weight changes


NEURO: Denies confusion, dizziness


MSK: Denies weakness, joint pain/swelling


SKIN: Denies jaundice, pruritus





Vitals:


Vitals:





 Vital Signs








  Date Time  Temp Pulse Resp B/P (MAP) Pulse Ox O2 Delivery O2 Flow Rate FiO2


 


11/21/18 11:00 98.3 77 18 128/85 (99) 95 Room Air  





 98.3       











Labs:


Labs:





Laboratory Tests








Test


 11/20/18


21:10 11/20/18


21:29 11/20/18


21:32 11/20/18


21:40


 


Urine Collection Type Unknown    


 


Urine Color Yellow    


 


Urine Clarity Clear    


 


Urine pH 5.5    


 


Urine Specific Gravity >=1.030    


 


Urine Protein


 Negative mg/dL


(NEG-TRACE) 


 


 





 


Urine Glucose (UA)


 >=1000 mg/dL


(NEG) 


 


 





 


Urine Ketones (Stick)


 Negative mg/dL


(NEG) 


 


 





 


Urine Blood Negative (NEG)    


 


Urine Nitrite Negative (NEG)    


 


Urine Bilirubin Negative (NEG)    


 


Urine Urobilinogen Dipstick


 0.2 mg/dL (0.2


mg/dL) 


 


 





 


Urine Leukocyte Esterase Negative (NEG)    


 


Urine RBC Occ /HPF (0-2)    


 


Urine WBC 1-4 /HPF (0-4)    


 


Urine Squamous Epithelial


Cells Few /LPF 


 


 


 





 


Urine Bacteria


 Moderate /HPF


(0-FEW) 


 


 





 


Stool Occult Blood  Negative (NEG)   


 


Bedside Urine HCG, Qualitative


 


 


 Hcg negative


(Negative) 





 


White Blood Count


 


 


 


 7.3 x10^3/uL


(4.0-11.0)


 


Red Blood Count


 


 


 


 4.94 x10^6/uL


(3.50-5.40)


 


Hemoglobin


 


 


 


 14.3 g/dL


(12.0-15.5)


 


Hematocrit


 


 


 


 41.2 %


(36.0-47.0)


 


Mean Corpuscular Volume    83 fL () 


 


Mean Corpuscular Hemoglobin    29 pg (25-35) 


 


Mean Corpuscular Hemoglobin


Concent 


 


 


 35 g/dL


(31-37)


 


Red Cell Distribution Width


 


 


 


 13.6 %


(11.5-14.5)


 


Platelet Count


 


 


 


 267 x10^3/uL


(140-400)


 


Neutrophils (%) (Auto)    61 % (31-73) 


 


Lymphocytes (%) (Auto)    29 % (24-48) 


 


Monocytes (%) (Auto)    7 % (0-9) 


 


Eosinophils (%) (Auto)    2 % (0-3) 


 


Basophils (%) (Auto)    1 % (0-3) 


 


Neutrophils # (Auto)


 


 


 


 4.5 x10^3uL


(1.8-7.7)


 


Lymphocytes # (Auto)


 


 


 


 2.1 x10^3/uL


(1.0-4.8)


 


Monocytes # (Auto)


 


 


 


 0.5 x10^3/uL


(0.0-1.1)


 


Eosinophils # (Auto)


 


 


 


 0.1 x10^3/uL


(0.0-0.7)


 


Basophils # (Auto)


 


 


 


 0.1 x10^3/uL


(0.0-0.2)


 


Prothrombin Time


 


 


 


 13.0 SEC


(11.7-14.0)


 


Prothromb Time International


Ratio 


 


 


 1.0 (0.8-1.1) 





 


Activated Partial


Thromboplast Time 


 


 


 29 SEC (24-38) 





 


Sodium Level


 


 


 


 133 mmol/L


(136-145)


 


Potassium Level


 


 


 


 3.8 mmol/L


(3.5-5.1)


 


Chloride Level


 


 


 


 96 mmol/L


()


 


Carbon Dioxide Level


 


 


 


 25 mmol/L


(21-32)


 


Anion Gap    12 (6-14) 


 


Blood Urea Nitrogen


 


 


 


 12 mg/dL


(7-20)


 


Creatinine


 


 


 


 0.8 mg/dL


(0.6-1.0)


 


Estimated GFR


(Cockcroft-Gault) 


 


 


 78.7 





 


BUN/Creatinine Ratio    15 (6-20) 


 


Glucose Level


 


 


 


 500 mg/dL


(70-99)


 


Calcium Level


 


 


 


 9.6 mg/dL


(8.5-10.1)


 


Total Bilirubin


 


 


 


 0.4 mg/dL


(0.2-1.0)


 


Aspartate Amino Transf


(AST/SGOT) 


 


 


 18 U/L (15-37) 





 


Alanine Aminotransferase


(ALT/SGPT) 


 


 


 40 U/L (14-59) 





 


Alkaline Phosphatase


 


 


 


 77 U/L


()


 


Total Protein


 


 


 


 8.5 g/dL


(6.4-8.2)


 


Albumin


 


 


 


 3.5 g/dL


(3.4-5.0)


 


Albumin/Globulin Ratio    0.7 (1.0-1.7) 


 


Test


 11/20/18


23:48 11/21/18


04:20 11/21/18


04:28 11/21/18


07:26


 


Glucose (Fingerstick)


 548 mg/dL


(70-99) 


 


 340 mg/dL


(70-99)


 


White Blood Count


 


 6.4 x10^3/uL


(4.0-11.0) 


 





 


Red Blood Count


 


 4.43 x10^6/uL


(3.50-5.40) 


 





 


Hemoglobin


 


 13.0 g/dL


(12.0-15.5) 


 





 


Hematocrit


 


 36.8 %


(36.0-47.0) 


 





 


Mean Corpuscular Volume  83 fL ()   


 


Mean Corpuscular Hemoglobin  29 pg (25-35)   


 


Mean Corpuscular Hemoglobin


Concent 


 35 g/dL


(31-37) 


 





 


Red Cell Distribution Width


 


 13.2 %


(11.5-14.5) 


 





 


Platelet Count


 


 239 x10^3/uL


(140-400) 


 





 


Neutrophils (%) (Auto)  57 % (31-73)   


 


Lymphocytes (%) (Auto)  33 % (24-48)   


 


Monocytes (%) (Auto)  8 % (0-9)   


 


Eosinophils (%) (Auto)  2 % (0-3)   


 


Basophils (%) (Auto)  1 % (0-3)   


 


Neutrophils # (Auto)


 


 3.6 x10^3uL


(1.8-7.7) 


 





 


Lymphocytes # (Auto)


 


 2.1 x10^3/uL


(1.0-4.8) 


 





 


Monocytes # (Auto)


 


 0.5 x10^3/uL


(0.0-1.1) 


 





 


Eosinophils # (Auto)


 


 0.1 x10^3/uL


(0.0-0.7) 


 





 


Basophils # (Auto)


 


 0.0 x10^3/uL


(0.0-0.2) 


 





 


Sodium Level


 


 


 134 mmol/L


(136-145) 





 


Potassium Level


 


 


 3.6 mmol/L


(3.5-5.1) 





 


Chloride Level


 


 


 100 mmol/L


() 





 


Carbon Dioxide Level


 


 


 25 mmol/L


(21-32) 





 


Anion Gap   9 (6-14)  


 


Blood Urea Nitrogen


 


 


 10 mg/dL


(7-20) 





 


Creatinine


 


 


 0.7 mg/dL


(0.6-1.0) 





 


Estimated GFR


(Cockcroft-Gault) 


 


 91.8 


 





 


BUN/Creatinine Ratio   14 (6-20)  


 


Glucose Level


 


 


 353 mg/dL


(70-99) 





 


Calcium Level


 


 


 8.9 mg/dL


(8.5-10.1) 





 


Total Bilirubin


 


 


 0.7 mg/dL


(0.2-1.0) 





 


Aspartate Amino Transf


(AST/SGOT) 


 


 15 U/L (15-37) 


 





 


Alanine Aminotransferase


(ALT/SGPT) 


 


 37 U/L (14-59) 


 





 


Alkaline Phosphatase


 


 


 72 U/L


() 





 


Total Protein


 


 


 7.3 g/dL


(6.4-8.2) 





 


Albumin


 


 


 3.1 g/dL


(3.4-5.0) 





 


Albumin/Globulin Ratio   0.7 (1.0-1.7)  











Allergies:


Coded Allergies:  


     acetaminophen (Verified  Allergy, Severe, THROAT SWELLS, 3/2/16)


     propoxyphene (Verified  Allergy, Severe, THROAT SWELLS, 3/2/16)


     Penicillins (Verified  Allergy, Intermediate, HIVES, 3/2/16)


     orange (Verified  Allergy, Intermediate, 11/8/18)


     vancomycin (Verified  Allergy, Intermediate, 11/7/18)





Medications:





Current Medications








 Medications


  (Trade)  Dose


 Ordered  Sig/Lila


 Route


 PRN Reason  Start Time


 Stop Time Status Last Admin


Dose Admin


 


 Sodium Chloride  500 ml @ 


 500 mls/hr  Q1H


 IV


   11/20/18 21:30


 11/20/18 22:00 DC 11/20/18 21:30





 


 Pantoprazole


 Sodium


  (PROTONIX VIAL


 for IV PUSH)  80 mg  1X  ONCE


 IVP


   11/20/18 22:30


 11/20/18 22:31 DC 11/20/18 22:36





 


 Hyoscyamine


  (Anaspaz)  0.125 mg  1X  ONCE


 PO


   11/20/18 22:45


 11/20/18 22:46 DC 11/20/18 22:36





 


 Sodium Chloride  1,000 ml @ 


 125 mls/hr  Q8H


 IV


   11/20/18 23:15


 11/21/18 23:14  11/21/18 05:38





 


 Enoxaparin Sodium


  (Lovenox 100mg


 Syringe)  100 mg  Q12HR


 SQ


   11/21/18 09:00


 11/21/18 11:14 DC 11/21/18 09:08





 


 Insulin Glargine


  (Lantus)  66 units  1X  ONCE


 SQ


   11/21/18 00:15


 11/21/18 00:16 DC 11/21/18 00:31





 


 Morphine Sulfate


  (Morphine


 Sulfate)  1 mg  PRN Q3HRS  PRN


 IV


 PAIN  11/21/18 00:00


    11/21/18 10:43














Imaging:


Imaging:


CT A/P


FINDINGS:


Limited evaluation of solid abdominal and pelvic organs due to lack of IV 

contrast. Heart is normal in size. No pericardial or pleural effusion. Clear 

lung bases. Noncontrast appearance of the liver, spleen, pancreas, adrenals 

within normal limits. Status post cholecystectomy. No nephrolithiasis or 

hydronephrosis. No enlarged retroperitoneal or pelvic adenopathy. No free 

pelvic fluid or ascites. Small fat-containing left inguinal hernia. No bowel 

obstruction. Anteverted uterus. Urinary bladder demonstrates no radiopaque 

stones. Normal appendix. No pneumoperitoneum. No suspicious bony lesion.


IMPRESSION:


Limited evaluation of solid abdominal and pelvic organs due to lack of IV 

contrast.


No acute findings.





PE:





GEN: NAD, drinking contrast for CT


HEENT: Atraumatic, PERRL


LUNGS: CTAB


HEART: RRR


ABD: overweight, NABS, soft, non-distended, vaguely tender LLQ


EXTREMITY: No edema


SKIN: No rashes, no jaundice


NEURO/PSYCH: A & O 3





A/P:


A/P:


N/v, chronic lower abd pain - question of blood in emesis and stools


Uncontrolled DM, Factor V Leiden on Coumadin


CRC screen - average risk, no previous


S/p cholecystectomy





--


Labs and CT unrevealing.


Plans for CT angio, await this.  If unrevealing, would consider GES as next 

step.


Okay for clears.  ADAT.


She does not take Reglan at home and has not needed Miralax since last 

discharge - has both ordered here.  Will add PPI QD and dicyclomine TID PRN.











CORY GARNER Nov 21, 2018 12:17

## 2018-11-21 NOTE — PDOC1
History and Physical


Date of Admission


Date of Admission


11/21/18





Identification/Chief Complaint


Chief Complaint


N/V, abd pain





Source


Source:  Chart review, Patient





History of Present Illness


History of Present Illness





HPI





Patient is a 43  year old [female] who presents with GI bleeding, N/V ,abd pain 

x3ds.





pt was dced by me 1 week ago for similar symptoms.


She said since dc, abd pain was better 2/10. till Sunday, She started to have 

severe diffuse abd pain, dull, 8/10, no radiation. also has multiple times N/V 

, with bright red blood. also has loose BM with some bright red blood clots. Hb 

stable in ER 14, and 13 today.


no fever, chills, cough, sob. 


has been taking warfarin for h/o dvt, PE, last time 2ys ago, factor 5 leiden. 

INR IN ER 1. 


abd ct in ER wo contrast non remarkable.





Past Medical History


Cardiovascular:  No pertinent hx


Pulmonary:  No pertinent hx


CENTRAL NERVOUS SYSTEM:  Migraine


GI:  No pertinent hx


Heme/Onc:  Other


Hepatobiliary:  No pertinent hx


Psych:  No pertinent hx


Rheumatologic:  No pertinent hx


Infectious disease:  No pertinent hx


Renal/:  No pertinent hx


Endocrine:  Diabetes





Past Surgical History


Past Surgical History:  Cholecystectomy, Tubal Ligation





Family History


Family History:  Diabetes, High Cholestrol, Migranes


Family History:  Grandparents





Social History


Smoke:  No


ALCOHOL:  none


Drugs:  None





Current Medications


Current Medications





Current Medications








 Medications


  (Trade)  Dose


 Ordered  Sig/Lila  Start Time


 Stop Time Status Last Admin


Dose Admin


 


 Atorvastatin


 Calcium


  (Lipitor)  40 mg  HS  11/21/18 21:00


     





 


 Clonazepam


  (KlonoPIN)  0.5 mg  PRN DAILY  PRN  11/21/18 00:00


     





 


 Dextrose


  (Dextrose


 50%-Water Syringe)  12.5 gm  PRN Q15MIN  PRN  11/21/18 11:15


     





 


 Dicyclomine HCl


  (Bentyl)  10 mg  QID  PRN  11/21/18 12:30


     





 


 Docusate Sodium


  (Colace)  100 mg  PRN DAILY  PRN  11/21/18 11:15


     





 


 Enoxaparin Sodium


  (Lovenox 100mg


 Syringe)  100 mg  Q12HR  11/21/18 09:00


 11/21/18 11:14 DC 11/21/18 09:08


100 MG


 


 Hydrochlorothiazide


  (Microzide)  12.5 mg  DAILY  11/21/18 09:00


 11/21/18 11:14 DC  





 


 Hyoscyamine


  (Anaspaz)  0.125 mg  1X  ONCE  11/20/18 22:45


 11/20/18 22:46 DC 11/20/18 22:36


0.125 MG


 


 Info


  (CONTRAST GIVEN


 -- Rx MONITORING)  1 each  PRN DAILY  PRN  11/21/18 11:30


 11/23/18 11:29   





 


 Insulin Glargine


  (Lantus)  50 units  QHS  11/21/18 21:00


     





 


 Insulin Human


 Lispro


  (HumaLOG)  20 units  TIDAC  11/21/18 11:30


     





 


 Iohexol


  (Omnipaque 240


 Mg/ml)  30 ml  1X  ONCE  11/21/18 11:30


 11/21/18 11:31 DC  





 


 Iohexol


  (Omnipaque 300


 Mg/ml)  75 ml  1X  ONCE  11/21/18 11:30


 11/21/18 11:31 DC  





 


 Lisinopril


  (Prinivil)  10 mg  DAILY  11/21/18 09:00


     





 


 Losartan Potassium


  (Cozaar)  100 mg  DAILY  11/21/18 09:00


 11/21/18 11:14 DC  





 


 Metoclopramide HCl


  (Reglan)  10 mg  [id number]  11/21/18 08:00


     





 


 Morphine Sulfate


  (Morphine


 Sulfate)  2 mg  PRN Q2HR  PRN  11/21/18 11:15


     





 


 Non-Formulary


 Medication


  (Quinapril/


 Hydrochlorothiazide


  (Quinapril-Hctz


 10-12.5 Mg Tab))  1 each  DAILY  11/21/18 09:00


   UNV  





 


 Ondansetron HCl


  (Zofran)  4 mg  PRN Q6HRS  PRN  11/21/18 11:15


     





 


 Pantoprazole


 Sodium


  (PROTONIX VIAL


 for IV PUSH)  80 mg  1X  ONCE  11/20/18 22:30


 11/20/18 22:31 DC 11/20/18 22:36


80 MG


 


 Pantoprazole


 Sodium


  (Protonix)  40 mg  DAILYAC  11/21/18 13:00


     





 


 Polyethylene


 Glycol


  (miraLAX PACKET)  17 gm  BID  11/21/18 09:00


     





 


 Pregabalin


  (Lyrica)  50 mg  BID  11/21/18 09:00


     





 


 Sertraline HCl


  (Zoloft)  50 mg  DAILY  11/21/18 09:00


     





 


 Sodium Chloride  1,000 ml @ 


 75 mls/hr  [id number]  11/21/18 12:00


     





 


 Tramadol HCl


  (Ultram)  50 mg  PRN Q6HRS  PRN  11/21/18 11:15


     














Allergies


Allergies





Allergies








Coded Allergies Type Severity Reaction Last Updated Verified


 


  acetaminophen Allergy Severe THROAT SWELLS 3/2/16 Yes


 


  propoxyphene Allergy Severe THROAT SWELLS 3/2/16 Yes


 


  Penicillins Allergy Intermediate HIVES 3/2/16 Yes


 


  orange Allergy Intermediate  11/8/18 Yes


 


  vancomycin Allergy Intermediate  11/7/18 Yes











ROS


Review of System


CONSTITUTIONAL:        No fever or chills


EYES:                          No recent changes


SKIN:               No rash or itching


CARDIOVASCULAR:     No chest pain, syncope, palpitations, or edema


RESPIRATORY:            No SOB or cough


GASTROINTESTINAL:    No nausea, vomiting or abdominal pain


NEUROLOGICAL:          No headaches or weakness


ENDOCRINE:               No cold or heat intolerance


GENITOURINARY:         No urgency or frequency of urination


MUSCULOSKELETAL:   No back pain or joint pain


LYMPHATICS:               No enlarged lymph nodes


PSYCHIATRIC:              No anxiety or depression





Physical Exam


Physical Exam


GEN.:    No apparent distress.  Alert and oriented.


HEENT:    Head is normocephalic, atraumatic


NECK:    Supple.  


LUNGS:    Clear to auscultation.


HEART:    RRR, S1, S2 present.  Peripheral pulses intact


ABDOMEN:    Soft,  Positive bowel sounds. diffuse moderate abd tenderness 

without guarding or rebound. 


EXTREMITIES:    Without any cyanosis.    


NEUROLOGIC:     Normal speech, normal tone


PSYCHIATRIC:    Normal affect, normal mood.


SKIN:   No ulcerations





Vitals


Vitals





Vital Signs








  Date Time  Temp Pulse Resp B/P (MAP) Pulse Ox O2 Delivery O2 Flow Rate FiO2


 


11/21/18 11:00 98.3 77 18 128/85 (99) 95 Room Air  





 98.3       











Labs


Labs





Laboratory Tests








Test


 11/20/18


21:10 11/20/18


21:29 11/20/18


21:32 11/20/18


21:40


 


Urine Collection Type Unknown    


 


Urine Color Yellow    


 


Urine Clarity Clear    


 


Urine pH 5.5    


 


Urine Specific Gravity >=1.030    


 


Urine Protein


 Negative mg/dL


(NEG-TRACE) 


 


 





 


Urine Glucose (UA)


 >=1000 mg/dL


(NEG) 


 


 





 


Urine Ketones (Stick)


 Negative mg/dL


(NEG) 


 


 





 


Urine Blood Negative (NEG)    


 


Urine Nitrite Negative (NEG)    


 


Urine Bilirubin Negative (NEG)    


 


Urine Urobilinogen Dipstick


 0.2 mg/dL (0.2


mg/dL) 


 


 





 


Urine Leukocyte Esterase Negative (NEG)    


 


Urine RBC Occ /HPF (0-2)    


 


Urine WBC 1-4 /HPF (0-4)    


 


Urine Squamous Epithelial


Cells Few /LPF 


 


 


 





 


Urine Bacteria


 Moderate /HPF


(0-FEW) 


 


 





 


Stool Occult Blood  Negative (NEG)   


 


Bedside Urine HCG, Qualitative


 


 


 Hcg negative


(Negative) 





 


White Blood Count


 


 


 


 7.3 x10^3/uL


(4.0-11.0)


 


Red Blood Count


 


 


 


 4.94 x10^6/uL


(3.50-5.40)


 


Hemoglobin


 


 


 


 14.3 g/dL


(12.0-15.5)


 


Hematocrit


 


 


 


 41.2 %


(36.0-47.0)


 


Mean Corpuscular Volume    83 fL () 


 


Mean Corpuscular Hemoglobin    29 pg (25-35) 


 


Mean Corpuscular Hemoglobin


Concent 


 


 


 35 g/dL


(31-37)


 


Red Cell Distribution Width


 


 


 


 13.6 %


(11.5-14.5)


 


Platelet Count


 


 


 


 267 x10^3/uL


(140-400)


 


Neutrophils (%) (Auto)    61 % (31-73) 


 


Lymphocytes (%) (Auto)    29 % (24-48) 


 


Monocytes (%) (Auto)    7 % (0-9) 


 


Eosinophils (%) (Auto)    2 % (0-3) 


 


Basophils (%) (Auto)    1 % (0-3) 


 


Neutrophils # (Auto)


 


 


 


 4.5 x10^3uL


(1.8-7.7)


 


Lymphocytes # (Auto)


 


 


 


 2.1 x10^3/uL


(1.0-4.8)


 


Monocytes # (Auto)


 


 


 


 0.5 x10^3/uL


(0.0-1.1)


 


Eosinophils # (Auto)


 


 


 


 0.1 x10^3/uL


(0.0-0.7)


 


Basophils # (Auto)


 


 


 


 0.1 x10^3/uL


(0.0-0.2)


 


Prothrombin Time


 


 


 


 13.0 SEC


(11.7-14.0)


 


Prothromb Time International


Ratio 


 


 


 1.0 (0.8-1.1) 





 


Activated Partial


Thromboplast Time 


 


 


 29 SEC (24-38) 





 


Sodium Level


 


 


 


 133 mmol/L


(136-145)


 


Potassium Level


 


 


 


 3.8 mmol/L


(3.5-5.1)


 


Chloride Level


 


 


 


 96 mmol/L


()


 


Carbon Dioxide Level


 


 


 


 25 mmol/L


(21-32)


 


Anion Gap    12 (6-14) 


 


Blood Urea Nitrogen


 


 


 


 12 mg/dL


(7-20)


 


Creatinine


 


 


 


 0.8 mg/dL


(0.6-1.0)


 


Estimated GFR


(Cockcroft-Gault) 


 


 


 78.7 





 


BUN/Creatinine Ratio    15 (6-20) 


 


Glucose Level


 


 


 


 500 mg/dL


(70-99)


 


Calcium Level


 


 


 


 9.6 mg/dL


(8.5-10.1)


 


Total Bilirubin


 


 


 


 0.4 mg/dL


(0.2-1.0)


 


Aspartate Amino Transf


(AST/SGOT) 


 


 


 18 U/L (15-37) 





 


Alanine Aminotransferase


(ALT/SGPT) 


 


 


 40 U/L (14-59) 





 


Alkaline Phosphatase


 


 


 


 77 U/L


()


 


Total Protein


 


 


 


 8.5 g/dL


(6.4-8.2)


 


Albumin


 


 


 


 3.5 g/dL


(3.4-5.0)


 


Albumin/Globulin Ratio    0.7 (1.0-1.7) 


 


Test


 11/20/18


23:48 11/21/18


04:20 11/21/18


04:28 11/21/18


07:26


 


Glucose (Fingerstick)


 548 mg/dL


(70-99) 


 


 340 mg/dL


(70-99)


 


White Blood Count


 


 6.4 x10^3/uL


(4.0-11.0) 


 





 


Red Blood Count


 


 4.43 x10^6/uL


(3.50-5.40) 


 





 


Hemoglobin


 


 13.0 g/dL


(12.0-15.5) 


 





 


Hematocrit


 


 36.8 %


(36.0-47.0) 


 





 


Mean Corpuscular Volume  83 fL ()   


 


Mean Corpuscular Hemoglobin  29 pg (25-35)   


 


Mean Corpuscular Hemoglobin


Concent 


 35 g/dL


(31-37) 


 





 


Red Cell Distribution Width


 


 13.2 %


(11.5-14.5) 


 





 


Platelet Count


 


 239 x10^3/uL


(140-400) 


 





 


Neutrophils (%) (Auto)  57 % (31-73)   


 


Lymphocytes (%) (Auto)  33 % (24-48)   


 


Monocytes (%) (Auto)  8 % (0-9)   


 


Eosinophils (%) (Auto)  2 % (0-3)   


 


Basophils (%) (Auto)  1 % (0-3)   


 


Neutrophils # (Auto)


 


 3.6 x10^3uL


(1.8-7.7) 


 





 


Lymphocytes # (Auto)


 


 2.1 x10^3/uL


(1.0-4.8) 


 





 


Monocytes # (Auto)


 


 0.5 x10^3/uL


(0.0-1.1) 


 





 


Eosinophils # (Auto)


 


 0.1 x10^3/uL


(0.0-0.7) 


 





 


Basophils # (Auto)


 


 0.0 x10^3/uL


(0.0-0.2) 


 





 


Sodium Level


 


 


 134 mmol/L


(136-145) 





 


Potassium Level


 


 


 3.6 mmol/L


(3.5-5.1) 





 


Chloride Level


 


 


 100 mmol/L


() 





 


Carbon Dioxide Level


 


 


 25 mmol/L


(21-32) 





 


Anion Gap   9 (6-14)  


 


Blood Urea Nitrogen


 


 


 10 mg/dL


(7-20) 





 


Creatinine


 


 


 0.7 mg/dL


(0.6-1.0) 





 


Estimated GFR


(Cockcroft-Gault) 


 


 91.8 


 





 


BUN/Creatinine Ratio   14 (6-20)  


 


Glucose Level


 


 


 353 mg/dL


(70-99) 





 


Calcium Level


 


 


 8.9 mg/dL


(8.5-10.1) 





 


Total Bilirubin


 


 


 0.7 mg/dL


(0.2-1.0) 





 


Aspartate Amino Transf


(AST/SGOT) 


 


 15 U/L (15-37) 


 





 


Alanine Aminotransferase


(ALT/SGPT) 


 


 37 U/L (14-59) 


 





 


Alkaline Phosphatase


 


 


 72 U/L


() 





 


Total Protein


 


 


 7.3 g/dL


(6.4-8.2) 





 


Albumin


 


 


 3.1 g/dL


(3.4-5.0) 





 


Albumin/Globulin Ratio   0.7 (1.0-1.7)  








Laboratory Tests








Test


 11/20/18


21:10 11/20/18


21:29 11/20/18


21:32 11/20/18


21:40


 


Urine Collection Type Unknown    


 


Urine Color Yellow    


 


Urine Clarity Clear    


 


Urine pH 5.5    


 


Urine Specific Gravity >=1.030    


 


Urine Protein


 Negative mg/dL


(NEG-TRACE) 


 


 





 


Urine Glucose (UA)


 >=1000 mg/dL


(NEG) 


 


 





 


Urine Ketones (Stick)


 Negative mg/dL


(NEG) 


 


 





 


Urine Blood Negative (NEG)    


 


Urine Nitrite Negative (NEG)    


 


Urine Bilirubin Negative (NEG)    


 


Urine Urobilinogen Dipstick


 0.2 mg/dL (0.2


mg/dL) 


 


 





 


Urine Leukocyte Esterase Negative (NEG)    


 


Urine RBC Occ /HPF (0-2)    


 


Urine WBC 1-4 /HPF (0-4)    


 


Urine Squamous Epithelial


Cells Few /LPF 


 


 


 





 


Urine Bacteria


 Moderate /HPF


(0-FEW) 


 


 





 


Stool Occult Blood  Negative (NEG)   


 


Bedside Urine HCG, Qualitative


 


 


 Hcg negative


(Negative) 





 


White Blood Count


 


 


 


 7.3 x10^3/uL


(4.0-11.0)


 


Red Blood Count


 


 


 


 4.94 x10^6/uL


(3.50-5.40)


 


Hemoglobin


 


 


 


 14.3 g/dL


(12.0-15.5)


 


Hematocrit


 


 


 


 41.2 %


(36.0-47.0)


 


Mean Corpuscular Volume    83 fL () 


 


Mean Corpuscular Hemoglobin    29 pg (25-35) 


 


Mean Corpuscular Hemoglobin


Concent 


 


 


 35 g/dL


(31-37)


 


Red Cell Distribution Width


 


 


 


 13.6 %


(11.5-14.5)


 


Platelet Count


 


 


 


 267 x10^3/uL


(140-400)


 


Neutrophils (%) (Auto)    61 % (31-73) 


 


Lymphocytes (%) (Auto)    29 % (24-48) 


 


Monocytes (%) (Auto)    7 % (0-9) 


 


Eosinophils (%) (Auto)    2 % (0-3) 


 


Basophils (%) (Auto)    1 % (0-3) 


 


Neutrophils # (Auto)


 


 


 


 4.5 x10^3uL


(1.8-7.7)


 


Lymphocytes # (Auto)


 


 


 


 2.1 x10^3/uL


(1.0-4.8)


 


Monocytes # (Auto)


 


 


 


 0.5 x10^3/uL


(0.0-1.1)


 


Eosinophils # (Auto)


 


 


 


 0.1 x10^3/uL


(0.0-0.7)


 


Basophils # (Auto)


 


 


 


 0.1 x10^3/uL


(0.0-0.2)


 


Prothrombin Time


 


 


 


 13.0 SEC


(11.7-14.0)


 


Prothromb Time International


Ratio 


 


 


 1.0 (0.8-1.1) 





 


Activated Partial


Thromboplast Time 


 


 


 29 SEC (24-38) 





 


Sodium Level


 


 


 


 133 mmol/L


(136-145)


 


Potassium Level


 


 


 


 3.8 mmol/L


(3.5-5.1)


 


Chloride Level


 


 


 


 96 mmol/L


()


 


Carbon Dioxide Level


 


 


 


 25 mmol/L


(21-32)


 


Anion Gap    12 (6-14) 


 


Blood Urea Nitrogen


 


 


 


 12 mg/dL


(7-20)


 


Creatinine


 


 


 


 0.8 mg/dL


(0.6-1.0)


 


Estimated GFR


(Cockcroft-Gault) 


 


 


 78.7 





 


BUN/Creatinine Ratio    15 (6-20) 


 


Glucose Level


 


 


 


 500 mg/dL


(70-99)


 


Calcium Level


 


 


 


 9.6 mg/dL


(8.5-10.1)


 


Total Bilirubin


 


 


 


 0.4 mg/dL


(0.2-1.0)


 


Aspartate Amino Transf


(AST/SGOT) 


 


 


 18 U/L (15-37) 





 


Alanine Aminotransferase


(ALT/SGPT) 


 


 


 40 U/L (14-59) 





 


Alkaline Phosphatase


 


 


 


 77 U/L


()


 


Total Protein


 


 


 


 8.5 g/dL


(6.4-8.2)


 


Albumin


 


 


 


 3.5 g/dL


(3.4-5.0)


 


Albumin/Globulin Ratio    0.7 (1.0-1.7) 


 


Test


 11/20/18


23:48 11/21/18


04:20 11/21/18


04:28 11/21/18


07:26


 


Glucose (Fingerstick)


 548 mg/dL


(70-99) 


 


 340 mg/dL


(70-99)


 


White Blood Count


 


 6.4 x10^3/uL


(4.0-11.0) 


 





 


Red Blood Count


 


 4.43 x10^6/uL


(3.50-5.40) 


 





 


Hemoglobin


 


 13.0 g/dL


(12.0-15.5) 


 





 


Hematocrit


 


 36.8 %


(36.0-47.0) 


 





 


Mean Corpuscular Volume  83 fL ()   


 


Mean Corpuscular Hemoglobin  29 pg (25-35)   


 


Mean Corpuscular Hemoglobin


Concent 


 35 g/dL


(31-37) 


 





 


Red Cell Distribution Width


 


 13.2 %


(11.5-14.5) 


 





 


Platelet Count


 


 239 x10^3/uL


(140-400) 


 





 


Neutrophils (%) (Auto)  57 % (31-73)   


 


Lymphocytes (%) (Auto)  33 % (24-48)   


 


Monocytes (%) (Auto)  8 % (0-9)   


 


Eosinophils (%) (Auto)  2 % (0-3)   


 


Basophils (%) (Auto)  1 % (0-3)   


 


Neutrophils # (Auto)


 


 3.6 x10^3uL


(1.8-7.7) 


 





 


Lymphocytes # (Auto)


 


 2.1 x10^3/uL


(1.0-4.8) 


 





 


Monocytes # (Auto)


 


 0.5 x10^3/uL


(0.0-1.1) 


 





 


Eosinophils # (Auto)


 


 0.1 x10^3/uL


(0.0-0.7) 


 





 


Basophils # (Auto)


 


 0.0 x10^3/uL


(0.0-0.2) 


 





 


Sodium Level


 


 


 134 mmol/L


(136-145) 





 


Potassium Level


 


 


 3.6 mmol/L


(3.5-5.1) 





 


Chloride Level


 


 


 100 mmol/L


() 





 


Carbon Dioxide Level


 


 


 25 mmol/L


(21-32) 





 


Anion Gap   9 (6-14)  


 


Blood Urea Nitrogen


 


 


 10 mg/dL


(7-20) 





 


Creatinine


 


 


 0.7 mg/dL


(0.6-1.0) 





 


Estimated GFR


(Cockcroft-Gault) 


 


 91.8 


 





 


BUN/Creatinine Ratio   14 (6-20)  


 


Glucose Level


 


 


 353 mg/dL


(70-99) 





 


Calcium Level


 


 


 8.9 mg/dL


(8.5-10.1) 





 


Total Bilirubin


 


 


 0.7 mg/dL


(0.2-1.0) 





 


Aspartate Amino Transf


(AST/SGOT) 


 


 15 U/L (15-37) 


 





 


Alanine Aminotransferase


(ALT/SGPT) 


 


 37 U/L (14-59) 


 





 


Alkaline Phosphatase


 


 


 72 U/L


() 





 


Total Protein


 


 


 7.3 g/dL


(6.4-8.2) 





 


Albumin


 


 


 3.1 g/dL


(3.4-5.0) 





 


Albumin/Globulin Ratio   0.7 (1.0-1.7)  











VTE Prophylaxis Ordered


VTE Prophylaxis Devices:  Yes


VTE Pharmacological Prophylaxi:  No





Assessment/Plan


Assessment/Plan








intractable N/V and abd pain with hemoptesis, and hematomesis and hematochezia


Acute blood loss with stable hb


Migraines


DM - with A1c of 12.6


FV leiden


subtherapeutic INR on warfarin


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


morbid obesity





plan:


gi consult


hold warfarin and lovenox today, resume tmr if no gib


CT angiography, if neg, consider GES


pain control prn


lantus 50u qhs, humalog 20u tid, ssi


ivf


pt not take reglan at home, dc. 


add protonix daily





diet: clear liquid


dvt ppx: SCD


code: FC


displacement: home when stable











TERESA LESLIE MD Nov 21, 2018 12:54

## 2018-11-22 VITALS — SYSTOLIC BLOOD PRESSURE: 120 MMHG | DIASTOLIC BLOOD PRESSURE: 73 MMHG

## 2018-11-22 VITALS — DIASTOLIC BLOOD PRESSURE: 88 MMHG | SYSTOLIC BLOOD PRESSURE: 144 MMHG

## 2018-11-22 VITALS — SYSTOLIC BLOOD PRESSURE: 132 MMHG | DIASTOLIC BLOOD PRESSURE: 74 MMHG

## 2018-11-22 VITALS — SYSTOLIC BLOOD PRESSURE: 138 MMHG | DIASTOLIC BLOOD PRESSURE: 85 MMHG

## 2018-11-22 VITALS — SYSTOLIC BLOOD PRESSURE: 151 MMHG | DIASTOLIC BLOOD PRESSURE: 95 MMHG

## 2018-11-22 VITALS — DIASTOLIC BLOOD PRESSURE: 57 MMHG | SYSTOLIC BLOOD PRESSURE: 103 MMHG

## 2018-11-22 LAB
ANION GAP SERPL CALC-SCNC: 10 MMOL/L (ref 6–14)
BASOPHILS # BLD AUTO: 0 X10^3/UL (ref 0–0.2)
BASOPHILS NFR BLD: 1 % (ref 0–3)
BUN SERPL-MCNC: 8 MG/DL (ref 7–20)
CALCIUM SERPL-MCNC: 8.5 MG/DL (ref 8.5–10.1)
CHLORIDE SERPL-SCNC: 102 MMOL/L (ref 98–107)
CO2 SERPL-SCNC: 24 MMOL/L (ref 21–32)
CREAT SERPL-MCNC: 0.7 MG/DL (ref 0.6–1)
EOSINOPHIL NFR BLD: 0.1 X10^3/UL (ref 0–0.7)
EOSINOPHIL NFR BLD: 2 % (ref 0–3)
ERYTHROCYTE [DISTWIDTH] IN BLOOD BY AUTOMATED COUNT: 13.8 % (ref 11.5–14.5)
GFR SERPLBLD BASED ON 1.73 SQ M-ARVRAT: 91.8 ML/MIN
GLUCOSE SERPL-MCNC: 312 MG/DL (ref 70–99)
HCT VFR BLD CALC: 35.5 % (ref 36–47)
HGB BLD-MCNC: 12.7 G/DL (ref 12–15.5)
LYMPHOCYTES # BLD: 1.5 X10^3/UL (ref 1–4.8)
LYMPHOCYTES NFR BLD AUTO: 25 % (ref 24–48)
MCH RBC QN AUTO: 30 PG (ref 25–35)
MCHC RBC AUTO-ENTMCNC: 36 G/DL (ref 31–37)
MCV RBC AUTO: 84 FL (ref 79–100)
MONO #: 0.5 X10^3/UL (ref 0–1.1)
MONOCYTES NFR BLD: 8 % (ref 0–9)
NEUT #: 4 X10^3UL (ref 1.8–7.7)
NEUTROPHILS NFR BLD AUTO: 65 % (ref 31–73)
PLATELET # BLD AUTO: 229 X10^3/UL (ref 140–400)
POTASSIUM SERPL-SCNC: 3.7 MMOL/L (ref 3.5–5.1)
PROTHROMBIN TIME: 13.7 SEC (ref 11.7–14)
RBC # BLD AUTO: 4.25 X10^6/UL (ref 3.5–5.4)
SODIUM SERPL-SCNC: 136 MMOL/L (ref 136–145)
WBC # BLD AUTO: 6.2 X10^3/UL (ref 4–11)

## 2018-11-22 RX ADMIN — Medication PRN EACH: at 13:29

## 2018-11-22 RX ADMIN — SERTRALINE HYDROCHLORIDE SCH MG: 50 TABLET ORAL at 08:24

## 2018-11-22 RX ADMIN — PANTOPRAZOLE SODIUM SCH MG: 40 TABLET, DELAYED RELEASE ORAL at 08:24

## 2018-11-22 RX ADMIN — BACITRACIN SCH MLS/HR: 5000 INJECTION, POWDER, FOR SOLUTION INTRAMUSCULAR at 12:13

## 2018-11-22 RX ADMIN — MORPHINE SULFATE PRN MG: 2 INJECTION, SOLUTION INTRAMUSCULAR; INTRAVENOUS at 17:17

## 2018-11-22 RX ADMIN — INSULIN LISPRO SCH UNITS: 100 INJECTION, SOLUTION INTRAVENOUS; SUBCUTANEOUS at 12:11

## 2018-11-22 RX ADMIN — PREGABALIN SCH MG: 50 CAPSULE ORAL at 08:24

## 2018-11-22 RX ADMIN — MORPHINE SULFATE PRN MG: 2 INJECTION, SOLUTION INTRAMUSCULAR; INTRAVENOUS at 22:41

## 2018-11-22 RX ADMIN — MORPHINE SULFATE PRN MG: 2 INJECTION, SOLUTION INTRAMUSCULAR; INTRAVENOUS at 00:36

## 2018-11-22 RX ADMIN — BACITRACIN SCH MLS/HR: 5000 INJECTION, POWDER, FOR SOLUTION INTRAMUSCULAR at 01:20

## 2018-11-22 RX ADMIN — INSULIN LISPRO SCH UNITS: 100 INJECTION, SOLUTION INTRAVENOUS; SUBCUTANEOUS at 08:33

## 2018-11-22 RX ADMIN — MORPHINE SULFATE PRN MG: 2 INJECTION, SOLUTION INTRAMUSCULAR; INTRAVENOUS at 12:17

## 2018-11-22 RX ADMIN — ENOXAPARIN SODIUM SCH MG: 100 INJECTION SUBCUTANEOUS at 22:40

## 2018-11-22 RX ADMIN — INSULIN LISPRO SCH UNITS: 100 INJECTION, SOLUTION INTRAVENOUS; SUBCUTANEOUS at 12:12

## 2018-11-22 RX ADMIN — ENOXAPARIN SODIUM SCH MG: 100 INJECTION SUBCUTANEOUS at 12:05

## 2018-11-22 RX ADMIN — BACITRACIN SCH MLS/HR: 5000 INJECTION, POWDER, FOR SOLUTION INTRAMUSCULAR at 00:39

## 2018-11-22 RX ADMIN — LISINOPRIL SCH MG: 10 TABLET ORAL at 12:04

## 2018-11-22 RX ADMIN — ATORVASTATIN CALCIUM SCH MG: 40 TABLET, FILM COATED ORAL at 22:39

## 2018-11-22 RX ADMIN — MORPHINE SULFATE PRN MG: 2 INJECTION, SOLUTION INTRAMUSCULAR; INTRAVENOUS at 08:23

## 2018-11-22 RX ADMIN — INSULIN LISPRO SCH UNITS: 100 INJECTION, SOLUTION INTRAVENOUS; SUBCUTANEOUS at 16:30

## 2018-11-22 RX ADMIN — INSULIN LISPRO SCH UNITS: 100 INJECTION, SOLUTION INTRAVENOUS; SUBCUTANEOUS at 17:00

## 2018-11-22 NOTE — PDOC
PROGRESS NOTES


Chief Complaint


Chief Complaint








intractable N/V and abd pain with hemoptesis, and hematomesis and hematochezia


Acute blood loss with stable hb


Migraines


DM - with A1c of 12.6


FV leiden


subtherapeutic INR on warfarin


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


morbid obesity





plan:


gi consulted 


CT angiography neg consider GES but cannot get it done till next week since 

holiday


pain control prn


increase lantus 60u qhs, humalog 25u tid, ssi


ivf


pt not take reglan at home, dc. 


add protonix daily





diet: advance to full liquid


dvt ppx: resume warfarin, add lovenox bid for bridging, INR daily


code: FC


displacement: home when stable





History of Present Illness


History of Present Illness


ROS: no fever, chills, sob or chest pain





some nausea,no vomiting


no BM


still has abd pain ,slightly better 6/10. 


hyperglycemia





Vitals


Vitals





Vital Signs








  Date Time  Temp Pulse Resp B/P (MAP) Pulse Ox O2 Delivery O2 Flow Rate FiO2


 


11/22/18 12:17      Room Air  


 


11/22/18 12:04  77  144/88    


 


11/22/18 11:00 97.5  18  98   





 97.5       











Physical Exam


General:  Alert, Oriented X3, Cooperative


Heart:  Regular rate, Normal S1, Normal S2


Lungs:  Clear


Abdomen:  Normal bowel sounds, Soft, Other (diffuse middle abd mild tenderness)


Extremities:  No clubbing, No cyanosis


Skin:  No rashes





Labs


LABS





Laboratory Tests








Test


 11/21/18


14:19 11/21/18


16:59 11/21/18


20:28 11/22/18


04:10


 


Glucose (Fingerstick)


 359 mg/dL


(70-99) 296 mg/dL


(70-99) 136 mg/dL


(70-99) 





 


White Blood Count


 


 


 


 6.2 x10^3/uL


(4.0-11.0)


 


Red Blood Count


 


 


 


 4.25 x10^6/uL


(3.50-5.40)


 


Hemoglobin


 


 


 


 12.7 g/dL


(12.0-15.5)


 


Hematocrit


 


 


 


 35.5 %


(36.0-47.0)


 


Mean Corpuscular Volume    84 fL () 


 


Mean Corpuscular Hemoglobin    30 pg (25-35) 


 


Mean Corpuscular Hemoglobin


Concent 


 


 


 36 g/dL


(31-37)


 


Red Cell Distribution Width


 


 


 


 13.8 %


(11.5-14.5)


 


Platelet Count


 


 


 


 229 x10^3/uL


(140-400)


 


Neutrophils (%) (Auto)    65 % (31-73) 


 


Lymphocytes (%) (Auto)    25 % (24-48) 


 


Monocytes (%) (Auto)    8 % (0-9) 


 


Eosinophils (%) (Auto)    2 % (0-3) 


 


Basophils (%) (Auto)    1 % (0-3) 


 


Neutrophils # (Auto)


 


 


 


 4.0 x10^3uL


(1.8-7.7)


 


Lymphocytes # (Auto)


 


 


 


 1.5 x10^3/uL


(1.0-4.8)


 


Monocytes # (Auto)


 


 


 


 0.5 x10^3/uL


(0.0-1.1)


 


Eosinophils # (Auto)


 


 


 


 0.1 x10^3/uL


(0.0-0.7)


 


Basophils # (Auto)


 


 


 


 0.0 x10^3/uL


(0.0-0.2)


 


Prothrombin Time


 


 


 


 13.7 SEC


(11.7-14.0)


 


Prothromb Time International


Ratio 


 


 


 1.1 (0.8-1.1) 





 


Sodium Level


 


 


 


 136 mmol/L


(136-145)


 


Potassium Level


 


 


 


 3.7 mmol/L


(3.5-5.1)


 


Chloride Level


 


 


 


 102 mmol/L


()


 


Carbon Dioxide Level


 


 


 


 24 mmol/L


(21-32)


 


Anion Gap    10 (6-14) 


 


Blood Urea Nitrogen    8 mg/dL (7-20) 


 


Creatinine


 


 


 


 0.7 mg/dL


(0.6-1.0)


 


Estimated GFR


(Cockcroft-Gault) 


 


 


 91.8 





 


Glucose Level


 


 


 


 312 mg/dL


(70-99)


 


Calcium Level


 


 


 


 8.5 mg/dL


(8.5-10.1)


 


Test


 11/22/18


07:38 11/22/18


10:58 


 





 


Glucose (Fingerstick)


 325 mg/dL


(70-99) 280 mg/dL


(70-99) 


 














Comment


Review of Relevant


I have reviewed the following items tucker (where applicable) has been applied.


Labs





Laboratory Tests








Test


 11/20/18


21:10 11/20/18


21:29 11/20/18


21:32 11/20/18


21:40


 


Urine Collection Type Unknown    


 


Urine Color Yellow    


 


Urine Clarity Clear    


 


Urine pH 5.5    


 


Urine Specific Gravity >=1.030    


 


Urine Protein


 Negative mg/dL


(NEG-TRACE) 


 


 





 


Urine Glucose (UA)


 >=1000 mg/dL


(NEG) 


 


 





 


Urine Ketones (Stick)


 Negative mg/dL


(NEG) 


 


 





 


Urine Blood Negative (NEG)    


 


Urine Nitrite Negative (NEG)    


 


Urine Bilirubin Negative (NEG)    


 


Urine Urobilinogen Dipstick


 0.2 mg/dL (0.2


mg/dL) 


 


 





 


Urine Leukocyte Esterase Negative (NEG)    


 


Urine RBC Occ /HPF (0-2)    


 


Urine WBC 1-4 /HPF (0-4)    


 


Urine Squamous Epithelial


Cells Few /LPF 


 


 


 





 


Urine Bacteria


 Moderate /HPF


(0-FEW) 


 


 





 


Stool Occult Blood  Negative (NEG)   


 


Bedside Urine HCG, Qualitative


 


 


 Hcg negative


(Negative) 





 


White Blood Count


 


 


 


 7.3 x10^3/uL


(4.0-11.0)


 


Red Blood Count


 


 


 


 4.94 x10^6/uL


(3.50-5.40)


 


Hemoglobin


 


 


 


 14.3 g/dL


(12.0-15.5)


 


Hematocrit


 


 


 


 41.2 %


(36.0-47.0)


 


Mean Corpuscular Volume    83 fL () 


 


Mean Corpuscular Hemoglobin    29 pg (25-35) 


 


Mean Corpuscular Hemoglobin


Concent 


 


 


 35 g/dL


(31-37)


 


Red Cell Distribution Width


 


 


 


 13.6 %


(11.5-14.5)


 


Platelet Count


 


 


 


 267 x10^3/uL


(140-400)


 


Neutrophils (%) (Auto)    61 % (31-73) 


 


Lymphocytes (%) (Auto)    29 % (24-48) 


 


Monocytes (%) (Auto)    7 % (0-9) 


 


Eosinophils (%) (Auto)    2 % (0-3) 


 


Basophils (%) (Auto)    1 % (0-3) 


 


Neutrophils # (Auto)


 


 


 


 4.5 x10^3uL


(1.8-7.7)


 


Lymphocytes # (Auto)


 


 


 


 2.1 x10^3/uL


(1.0-4.8)


 


Monocytes # (Auto)


 


 


 


 0.5 x10^3/uL


(0.0-1.1)


 


Eosinophils # (Auto)


 


 


 


 0.1 x10^3/uL


(0.0-0.7)


 


Basophils # (Auto)


 


 


 


 0.1 x10^3/uL


(0.0-0.2)


 


Prothrombin Time


 


 


 


 13.0 SEC


(11.7-14.0)


 


Prothromb Time International


Ratio 


 


 


 1.0 (0.8-1.1) 





 


Activated Partial


Thromboplast Time 


 


 


 29 SEC (24-38) 





 


Sodium Level


 


 


 


 133 mmol/L


(136-145)


 


Potassium Level


 


 


 


 3.8 mmol/L


(3.5-5.1)


 


Chloride Level


 


 


 


 96 mmol/L


()


 


Carbon Dioxide Level


 


 


 


 25 mmol/L


(21-32)


 


Anion Gap    12 (6-14) 


 


Blood Urea Nitrogen


 


 


 


 12 mg/dL


(7-20)


 


Creatinine


 


 


 


 0.8 mg/dL


(0.6-1.0)


 


Estimated GFR


(Cockcroft-Gault) 


 


 


 78.7 





 


BUN/Creatinine Ratio    15 (6-20) 


 


Glucose Level


 


 


 


 500 mg/dL


(70-99)


 


Calcium Level


 


 


 


 9.6 mg/dL


(8.5-10.1)


 


Total Bilirubin


 


 


 


 0.4 mg/dL


(0.2-1.0)


 


Aspartate Amino Transf


(AST/SGOT) 


 


 


 18 U/L (15-37) 





 


Alanine Aminotransferase


(ALT/SGPT) 


 


 


 40 U/L (14-59) 





 


Alkaline Phosphatase


 


 


 


 77 U/L


()


 


Total Protein


 


 


 


 8.5 g/dL


(6.4-8.2)


 


Albumin


 


 


 


 3.5 g/dL


(3.4-5.0)


 


Albumin/Globulin Ratio    0.7 (1.0-1.7) 


 


Test


 11/20/18


23:48 11/21/18


04:20 11/21/18


04:28 11/21/18


07:26


 


Glucose (Fingerstick)


 548 mg/dL


(70-99) 


 


 340 mg/dL


(70-99)


 


White Blood Count


 


 6.4 x10^3/uL


(4.0-11.0) 


 





 


Red Blood Count


 


 4.43 x10^6/uL


(3.50-5.40) 


 





 


Hemoglobin


 


 13.0 g/dL


(12.0-15.5) 


 





 


Hematocrit


 


 36.8 %


(36.0-47.0) 


 





 


Mean Corpuscular Volume  83 fL ()   


 


Mean Corpuscular Hemoglobin  29 pg (25-35)   


 


Mean Corpuscular Hemoglobin


Concent 


 35 g/dL


(31-37) 


 





 


Red Cell Distribution Width


 


 13.2 %


(11.5-14.5) 


 





 


Platelet Count


 


 239 x10^3/uL


(140-400) 


 





 


Neutrophils (%) (Auto)  57 % (31-73)   


 


Lymphocytes (%) (Auto)  33 % (24-48)   


 


Monocytes (%) (Auto)  8 % (0-9)   


 


Eosinophils (%) (Auto)  2 % (0-3)   


 


Basophils (%) (Auto)  1 % (0-3)   


 


Neutrophils # (Auto)


 


 3.6 x10^3uL


(1.8-7.7) 


 





 


Lymphocytes # (Auto)


 


 2.1 x10^3/uL


(1.0-4.8) 


 





 


Monocytes # (Auto)


 


 0.5 x10^3/uL


(0.0-1.1) 


 





 


Eosinophils # (Auto)


 


 0.1 x10^3/uL


(0.0-0.7) 


 





 


Basophils # (Auto)


 


 0.0 x10^3/uL


(0.0-0.2) 


 





 


Sodium Level


 


 


 134 mmol/L


(136-145) 





 


Potassium Level


 


 


 3.6 mmol/L


(3.5-5.1) 





 


Chloride Level


 


 


 100 mmol/L


() 





 


Carbon Dioxide Level


 


 


 25 mmol/L


(21-32) 





 


Anion Gap   9 (6-14)  


 


Blood Urea Nitrogen


 


 


 10 mg/dL


(7-20) 





 


Creatinine


 


 


 0.7 mg/dL


(0.6-1.0) 





 


Estimated GFR


(Cockcroft-Gault) 


 


 91.8 


 





 


BUN/Creatinine Ratio   14 (6-20)  


 


Glucose Level


 


 


 353 mg/dL


(70-99) 





 


Calcium Level


 


 


 8.9 mg/dL


(8.5-10.1) 





 


Total Bilirubin


 


 


 0.7 mg/dL


(0.2-1.0) 





 


Aspartate Amino Transf


(AST/SGOT) 


 


 15 U/L (15-37) 


 





 


Alanine Aminotransferase


(ALT/SGPT) 


 


 37 U/L (14-59) 


 





 


Alkaline Phosphatase


 


 


 72 U/L


() 





 


Total Protein


 


 


 7.3 g/dL


(6.4-8.2) 





 


Albumin


 


 


 3.1 g/dL


(3.4-5.0) 





 


Albumin/Globulin Ratio   0.7 (1.0-1.7)  


 


Test


 11/21/18


12:44 11/21/18


14:19 11/21/18


16:59 11/21/18


20:28


 


Glucose (Fingerstick)


 277 mg/dL


(70-99) 359 mg/dL


(70-99) 296 mg/dL


(70-99) 136 mg/dL


(70-99)


 


Test


 11/22/18


04:10 11/22/18


07:38 11/22/18


10:58 





 


White Blood Count


 6.2 x10^3/uL


(4.0-11.0) 


 


 





 


Red Blood Count


 4.25 x10^6/uL


(3.50-5.40) 


 


 





 


Hemoglobin


 12.7 g/dL


(12.0-15.5) 


 


 





 


Hematocrit


 35.5 %


(36.0-47.0) 


 


 





 


Mean Corpuscular Volume 84 fL ()    


 


Mean Corpuscular Hemoglobin 30 pg (25-35)    


 


Mean Corpuscular Hemoglobin


Concent 36 g/dL


(31-37) 


 


 





 


Red Cell Distribution Width


 13.8 %


(11.5-14.5) 


 


 





 


Platelet Count


 229 x10^3/uL


(140-400) 


 


 





 


Neutrophils (%) (Auto) 65 % (31-73)    


 


Lymphocytes (%) (Auto) 25 % (24-48)    


 


Monocytes (%) (Auto) 8 % (0-9)    


 


Eosinophils (%) (Auto) 2 % (0-3)    


 


Basophils (%) (Auto) 1 % (0-3)    


 


Neutrophils # (Auto)


 4.0 x10^3uL


(1.8-7.7) 


 


 





 


Lymphocytes # (Auto)


 1.5 x10^3/uL


(1.0-4.8) 


 


 





 


Monocytes # (Auto)


 0.5 x10^3/uL


(0.0-1.1) 


 


 





 


Eosinophils # (Auto)


 0.1 x10^3/uL


(0.0-0.7) 


 


 





 


Basophils # (Auto)


 0.0 x10^3/uL


(0.0-0.2) 


 


 





 


Prothrombin Time


 13.7 SEC


(11.7-14.0) 


 


 





 


Prothromb Time International


Ratio 1.1 (0.8-1.1) 


 


 


 





 


Sodium Level


 136 mmol/L


(136-145) 


 


 





 


Potassium Level


 3.7 mmol/L


(3.5-5.1) 


 


 





 


Chloride Level


 102 mmol/L


() 


 


 





 


Carbon Dioxide Level


 24 mmol/L


(21-32) 


 


 





 


Anion Gap 10 (6-14)    


 


Blood Urea Nitrogen 8 mg/dL (7-20)    


 


Creatinine


 0.7 mg/dL


(0.6-1.0) 


 


 





 


Estimated GFR


(Cockcroft-Gault) 91.8 


 


 


 





 


Glucose Level


 312 mg/dL


(70-99) 


 


 





 


Calcium Level


 8.5 mg/dL


(8.5-10.1) 


 


 





 


Glucose (Fingerstick)


 


 325 mg/dL


(70-99) 280 mg/dL


(70-99) 











Laboratory Tests








Test


 11/21/18


14:19 11/21/18


16:59 11/21/18


20:28 11/22/18


04:10


 


Glucose (Fingerstick)


 359 mg/dL


(70-99) 296 mg/dL


(70-99) 136 mg/dL


(70-99) 





 


White Blood Count


 


 


 


 6.2 x10^3/uL


(4.0-11.0)


 


Red Blood Count


 


 


 


 4.25 x10^6/uL


(3.50-5.40)


 


Hemoglobin


 


 


 


 12.7 g/dL


(12.0-15.5)


 


Hematocrit


 


 


 


 35.5 %


(36.0-47.0)


 


Mean Corpuscular Volume    84 fL () 


 


Mean Corpuscular Hemoglobin    30 pg (25-35) 


 


Mean Corpuscular Hemoglobin


Concent 


 


 


 36 g/dL


(31-37)


 


Red Cell Distribution Width


 


 


 


 13.8 %


(11.5-14.5)


 


Platelet Count


 


 


 


 229 x10^3/uL


(140-400)


 


Neutrophils (%) (Auto)    65 % (31-73) 


 


Lymphocytes (%) (Auto)    25 % (24-48) 


 


Monocytes (%) (Auto)    8 % (0-9) 


 


Eosinophils (%) (Auto)    2 % (0-3) 


 


Basophils (%) (Auto)    1 % (0-3) 


 


Neutrophils # (Auto)


 


 


 


 4.0 x10^3uL


(1.8-7.7)


 


Lymphocytes # (Auto)


 


 


 


 1.5 x10^3/uL


(1.0-4.8)


 


Monocytes # (Auto)


 


 


 


 0.5 x10^3/uL


(0.0-1.1)


 


Eosinophils # (Auto)


 


 


 


 0.1 x10^3/uL


(0.0-0.7)


 


Basophils # (Auto)


 


 


 


 0.0 x10^3/uL


(0.0-0.2)


 


Prothrombin Time


 


 


 


 13.7 SEC


(11.7-14.0)


 


Prothromb Time International


Ratio 


 


 


 1.1 (0.8-1.1) 





 


Sodium Level


 


 


 


 136 mmol/L


(136-145)


 


Potassium Level


 


 


 


 3.7 mmol/L


(3.5-5.1)


 


Chloride Level


 


 


 


 102 mmol/L


()


 


Carbon Dioxide Level


 


 


 


 24 mmol/L


(21-32)


 


Anion Gap    10 (6-14) 


 


Blood Urea Nitrogen    8 mg/dL (7-20) 


 


Creatinine


 


 


 


 0.7 mg/dL


(0.6-1.0)


 


Estimated GFR


(Cockcroft-Gault) 


 


 


 91.8 





 


Glucose Level


 


 


 


 312 mg/dL


(70-99)


 


Calcium Level


 


 


 


 8.5 mg/dL


(8.5-10.1)


 


Test


 11/22/18


07:38 11/22/18


10:58 


 





 


Glucose (Fingerstick)


 325 mg/dL


(70-99) 280 mg/dL


(70-99) 


 











Medications





Current Medications


Sodium Chloride 500 ml @  500 mls/hr Q1H IV  Last administered on 11/20/18at 21:

30;  Start 11/20/18 at 21:30;  Stop 11/20/18 at 22:00;  Status DC


Pantoprazole Sodium (PROTONIX VIAL for IV PUSH) 80 mg 1X  ONCE IVP  Last 

administered on 11/20/18at 22:36;  Start 11/20/18 at 22:30;  Stop 11/20/18 at 22

:31;  Status DC


Hyoscyamine (Anaspaz) 0.125 mg 1X  ONCE PO  Last administered on 11/20/18at 22:

36;  Start 11/20/18 at 22:45;  Stop 11/20/18 at 22:46;  Status DC


Ondansetron HCl (Zofran) 4 mg PRN Q8HRS  PRN IV NAUSEA/VOMITING;  Start 11/20/ 18 at 23:15;  Stop 11/21/18 at 12:51;  Status DC


Sodium Chloride 1,000 ml @  125 mls/hr Q8H IV  Last administered on 11/22/18at 

00:39;  Start 11/20/18 at 23:15;  Stop 11/21/18 at 23:14;  Status DC


Clonazepam (KlonoPIN) 0.5 mg PRN DAILY  PRN PO ANXIETY / AGITATION;  Start 11/21 /18 at 00:00


Enoxaparin Sodium (Lovenox 100mg Syringe) 100 mg Q12HR SQ  Last administered on 

11/21/18at 09:08;  Start 11/21/18 at 09:00;  Stop 11/21/18 at 11:14;  Status DC


Insulin Glargine (Lantus) 66 units QHS SQ ;  Start 11/21/18 at 21:00;  Stop 11/ 21/18 at 21:00;  Status DC


Metoclopramide HCl (Reglan) 10 mg TIDWMEALHC PO ;  Start 11/21/18 at 08:00;  

Stop 11/21/18 at 12:46;  Status DC


Pregabalin (Lyrica) 50 mg BID PO  Last administered on 11/21/18at 20:31;  Start 

11/21/18 at 09:00;  Stop 11/22/18 at 11:17;  Status DC


Sertraline HCl (Zoloft) 50 mg DAILY PO ;  Start 11/21/18 at 09:00;  Stop 11/22/ 18 at 11:17;  Status DC


Losartan Potassium (Cozaar) 100 mg DAILY PO ;  Start 11/21/18 at 09:00;  Stop 11 /21/18 at 11:14;  Status DC


Polyethylene Glycol (miraLAX PACKET) 17 gm BID PO ;  Start 11/21/18 at 09:00;  

Stop 11/21/18 at 12:46;  Status DC


Non-Formulary Medication (Quinapril/ Hydrochlorothiazide (Quinapril-Hctz 10-

12.5 Mg Tab)) 1 each DAILY PO ;  Start 11/21/18 at 09:00;  Status UNV


Atorvastatin Calcium (Lipitor) 40 mg HS PO  Last administered on 11/21/18at 20:

31;  Start 11/21/18 at 21:00


Insulin Glargine (Lantus) 66 units 1X  ONCE SQ  Last administered on 11/21/18at 

00:31;  Start 11/21/18 at 00:15;  Stop 11/21/18 at 00:16;  Status DC


Morphine Sulfate (Morphine Sulfate) 1 mg PRN Q3HRS  PRN IV MODERATE PAIN Last 

administered on 11/21/18at 19:27;  Start 11/21/18 at 00:00


Lisinopril (Prinivil) 10 mg DAILY PO  Last administered on 11/22/18at 12:04;  

Start 11/21/18 at 09:00


Hydrochlorothiazide (Microzide) 12.5 mg DAILY PO ;  Start 11/21/18 at 09:00;  

Stop 11/21/18 at 11:14;  Status DC


Insulin Glargine (Lantus) 50 units QHS SQ  Last administered on 11/21/18at 20:39

;  Start 11/21/18 at 21:00;  Stop 11/22/18 at 11:17;  Status DC


Ondansetron HCl (Zofran) 4 mg PRN Q6HRS  PRN IV NAUSEA/VOMITING;  Start 11/21/ 18 at 11:15;  Stop 11/21/18 at 12:52;  Status DC


Morphine Sulfate (Morphine Sulfate) 2 mg PRN Q2HR  PRN IV MODERATE TO SEVERE 

PAIN;  Start 11/21/18 at 11:15;  Stop 11/21/18 at 12:51;  Status DC


Tramadol HCl (Ultram) 50 mg PRN Q6HRS  PRN PO MILD TO MODERATE PAIN;  Start 11/ 21/18 at 11:15


Docusate Sodium (Colace) 100 mg PRN DAILY  PRN PO CONSTIPATION;  Start 11/21/18 

at 11:15;  Stop 11/21/18 at 12:51;  Status DC


Insulin Human Lispro (HumaLOG) 0-9 UNITS TIDWMEALS SQ  Last administered on 11/ 22/18at 12:11;  Start 11/21/18 at 12:00


Dextrose (Dextrose 50%-Water Syringe) 12.5 gm PRN Q15MIN  PRN IV SEE COMMENTS;  

Start 11/21/18 at 11:15


Insulin Human Lispro (HumaLOG) 20 units TIDAC SQ  Last administered on 11/22/ 18at 12:12;  Start 11/21/18 at 11:30


Sodium Chloride 1,000 ml @  75 mls/hr [id number] IV  Last administered on 11/22/ 18at 12:13;  Start 11/21/18 at 12:00


Iohexol (Omnipaque 240 Mg/ml) 30 ml 1X  ONCE PO ;  Start 11/21/18 at 11:30;  

Stop 11/21/18 at 11:31;  Status DC


Iohexol (Omnipaque 300 Mg/ml) 75 ml 1X  ONCE IV  Last administered on 11/21/ 18at 11:30;  Start 11/21/18 at 11:30;  Stop 11/21/18 at 11:31;  Status DC


Info (CONTRAST GIVEN -- Rx MONITORING) 1 each PRN DAILY  PRN MC SEE COMMENTS;  

Start 11/21/18 at 11:30;  Stop 11/23/18 at 11:29


Pantoprazole Sodium (Protonix) 40 mg DAILYAC PO  Last administered on 11/22/ 18at 08:24;  Start 11/21/18 at 13:00


Dicyclomine HCl (Bentyl) 10 mg QID  PRN PO abdominal pain;  Start 11/21/18 at 12

:30


Ondansetron HCl (Zofran) 4 mg PRN Q6HRS  PRN IV NAUSEA/VOMITING;  Start 11/21/ 18 at 12:45


Morphine Sulfate (Morphine Sulfate) 2 mg PRN Q2HR  PRN IV SEVERE PAIN Last 

administered on 11/22/18at 12:17;  Start 11/21/18 at 12:45


Docusate Sodium (Colace) 100 mg PRN DAILY  PRN PO CONSTIPATION;  Start 11/21/18 

at 12:45


Insulin Glargine (Lantus) 60 units QHS SQ ;  Start 11/22/18 at 21:00


Enoxaparin Sodium (Lovenox 100mg Syringe) 100 mg Q12HR SQ  Last administered on 

11/22/18at 12:05;  Start 11/22/18 at 12:00


Warfarin Sodium (Coumadin) 5 mg DAILY16 PO ;  Start 11/22/18 at 16:00


Warfarin Sodium (Coumadin Per Physician) 1 each PRN DAILY  PRN MC SEE COMMENTS 

Last administered on 11/22/18at 13:29;  Start 11/22/18 at 11:30





Active Scripts


Active


Polyethylene Glycol 3350 17 Gm Powd.pack 17 Gm PO BID


Enoxaparin Sodium 100 Mg/1 Ml Disp.syrin 100 Mg SQ Q12HR 14 Days


Reported


Atacand (Candesartan Cilexetil) 32 Mg Tablet 1 Tab PO DAILY


Quinapril-Hctz 10-12.5 Mg Tab (Quinapril/Hydrochlorothiazide) 1 Each Tablet 1 

Each PO DAILY


Relpax (Eletriptan Hbr) 20 Mg Tablet 20 Mg PO 


Reglan (Metoclopramide Hcl) 10 Mg Tablet 10 Mg PO TIDWMEALHC


Lyrica (Pregabalin) 50 Mg Capsule 50 Mg PO BID


Clonazepam 0.5 Mg Tablet 1 Tab PO PRN DAILY PRN


Zoloft (Sertraline Hcl) 50 Mg Tablet 1 Tab PO DAILY


Crestor (Rosuvastatin Calcium) 10 Mg Tablet 1 Tab PO DAILY


Lantus Solostar (Insulin Glargine,Hum.rec.anlog) 100 Unit/1 Ml Insuln.pen 66 

Unit SQ QHS


Vitals/I & O





Vital Sign - Last 24 Hours








 11/21/18 11/21/18 11/21/18 11/21/18





 14:47 15:00 17:07 19:00


 


Temp  98.3  99.7





  98.3  99.7


 


Pulse  81  84


 


Resp  18  17


 


B/P (MAP)  130/87 (101)  128/80 (96)


 


Pulse Ox  96 95 97


 


O2 Delivery Room Air Room Air  Room Air


 


    





    





 11/21/18 11/21/18 11/21/18 11/21/18





 19:27 20:00 20:00 23:00


 


Temp    99.0





    99.0


 


Pulse    86


 


Resp 17 17  18


 


B/P (MAP)    105/62 (76)


 


Pulse Ox    100


 


O2 Delivery Room Air Room Air Room Air Room Air


 


    





    





 11/22/18 11/22/18 11/22/18 11/22/18





 00:36 01:10 03:00 07:00


 


Temp   98.8 98.4





   98.8 98.4


 


Pulse   83 80


 


Resp 17 17 18 18


 


B/P (MAP)   120/73 (89) 103/57 (72)


 


Pulse Ox   98 96


 


O2 Delivery Room Air  Room Air Room Air


 


    





    





 11/22/18 11/22/18 11/22/18 11/22/18





 08:00 08:23 09:00 11:00


 


Temp    97.5





    97.5


 


Pulse    77


 


Resp    18


 


B/P (MAP)    144/88 (106)


 


Pulse Ox    98


 


O2 Delivery Room Air Room Air Room Air Room Air


 


    





    





 11/22/18 11/22/18  





 12:04 12:17  


 


Pulse 77   


 


B/P (MAP) 144/88   


 


O2 Delivery  Room Air  














Intake and Output   


 


 11/21/18 11/21/18 11/22/18





 15:00 23:00 07:00


 


Intake Total 0 ml  


 


Balance 0 ml  

















TERESA LESLIE MD Nov 22, 2018 14:10

## 2018-11-22 NOTE — PDOC
Subjective:


Subjective:


Patient was seen today. No acute events over night. She has mild left lower 

quadrant abdominal pain. She has not had bowel movement over night . No 

hematemesis ,melena or hematochezia. No fever, chills or sweating. No other 

acute symptoms.





Objective:


Vital Signs:





 Vital Signs








  Date Time  Temp Pulse Resp B/P (MAP) Pulse Ox O2 Delivery O2 Flow Rate FiO2


 


11/22/18 17:17      Room Air  


 


11/22/18 15:00 98.2 71 18 138/85 (102) 97   





 98.2       








Labs:





Laboratory Tests








Test


 11/21/18


20:28 11/22/18


07:38 11/22/18


10:58 11/22/18


16:37


 


Glucose (Fingerstick)


 136 mg/dL


(70-99) 325 mg/dL


(70-99) 280 mg/dL


(70-99) 198 mg/dL


(70-99)











PE:





GEN: NAD


HEENT: Atraumatic, PERRLA


LUNGS: CTAB


HEART: RRR, no murmurs


ABD: Mild direct tenderness in the LLQ area. No guarding or rebound tenderness.


EXTREMITY: No edema


SKIN: No rashes, no jaundice


NEURO/PSYCH: A & O 3





A/P:








43 years old female patient with past medical history of metabolic syndrome 

with obesity, hyperlipidemia and diabetes mellitus. She has also history of 

hypercoagulability with Factor V Leiden complicated by DVT/PE. She has been on 

chronic anticoagulation with Coumadin. patient admitted with 'after presented 

with left lower quadrant abdominal pain. Imaging of the abdomen with CTA ruled 

out ischemic colitis.





* Left lower quadrant abdominal pain. CTA ruled out ischemic colitis or other 

acute process.


* Hepatomegaly: Likely related to underlying NAFLD. LFTs checked in this 

admission with in normal limit.





Recommendations:


* No evidence of ischemic colitis or other acute process on imaging of abdomen 

with CT.


* Advance diet as tolerated.


* Supportive care with pain management.


* Bowel regimen: Miralax 17gram 1-2 time per day as needed.


* Advise weight reduction with diet and exercise.


* Follow up with GI for colonoscopy as out patient as scheduled before.


* Patient can be discharged from GI stand point.


* GI will be available for any Q's.








Thank you for involving us in the care of this patient.











LENNIE BAIRES MD Nov 22, 2018 17:40

## 2018-11-23 VITALS — DIASTOLIC BLOOD PRESSURE: 81 MMHG | SYSTOLIC BLOOD PRESSURE: 139 MMHG

## 2018-11-23 VITALS — SYSTOLIC BLOOD PRESSURE: 145 MMHG | DIASTOLIC BLOOD PRESSURE: 67 MMHG

## 2018-11-23 VITALS — DIASTOLIC BLOOD PRESSURE: 91 MMHG | SYSTOLIC BLOOD PRESSURE: 144 MMHG

## 2018-11-23 LAB
ANION GAP SERPL CALC-SCNC: 7 MMOL/L (ref 6–14)
BASOPHILS # BLD AUTO: 0 X10^3/UL (ref 0–0.2)
BASOPHILS NFR BLD: 0 % (ref 0–3)
BUN SERPL-MCNC: 8 MG/DL (ref 7–20)
CALCIUM SERPL-MCNC: 8.6 MG/DL (ref 8.5–10.1)
CHLORIDE SERPL-SCNC: 106 MMOL/L (ref 98–107)
CO2 SERPL-SCNC: 25 MMOL/L (ref 21–32)
CREAT SERPL-MCNC: 0.7 MG/DL (ref 0.6–1)
EOSINOPHIL NFR BLD: 0.1 X10^3/UL (ref 0–0.7)
EOSINOPHIL NFR BLD: 2 % (ref 0–3)
ERYTHROCYTE [DISTWIDTH] IN BLOOD BY AUTOMATED COUNT: 13.2 % (ref 11.5–14.5)
GFR SERPLBLD BASED ON 1.73 SQ M-ARVRAT: 91.8 ML/MIN
GLUCOSE SERPL-MCNC: 311 MG/DL (ref 70–99)
HCT VFR BLD CALC: 32.7 % (ref 36–47)
HGB BLD-MCNC: 11.5 G/DL (ref 12–15.5)
LYMPHOCYTES # BLD: 1.8 X10^3/UL (ref 1–4.8)
LYMPHOCYTES NFR BLD AUTO: 35 % (ref 24–48)
MCH RBC QN AUTO: 29 PG (ref 25–35)
MCHC RBC AUTO-ENTMCNC: 35 G/DL (ref 31–37)
MCV RBC AUTO: 84 FL (ref 79–100)
MONO #: 0.4 X10^3/UL (ref 0–1.1)
MONOCYTES NFR BLD: 7 % (ref 0–9)
NEUT #: 2.8 X10^3UL (ref 1.8–7.7)
NEUTROPHILS NFR BLD AUTO: 55 % (ref 31–73)
PLATELET # BLD AUTO: 209 X10^3/UL (ref 140–400)
POTASSIUM SERPL-SCNC: 3.5 MMOL/L (ref 3.5–5.1)
PROTHROMBIN TIME: 14 SEC (ref 11.7–14)
RBC # BLD AUTO: 3.89 X10^6/UL (ref 3.5–5.4)
SODIUM SERPL-SCNC: 138 MMOL/L (ref 136–145)
WBC # BLD AUTO: 5.1 X10^3/UL (ref 4–11)

## 2018-11-23 RX ADMIN — ENOXAPARIN SODIUM SCH MG: 100 INJECTION SUBCUTANEOUS at 09:30

## 2018-11-23 RX ADMIN — INSULIN LISPRO SCH UNITS: 100 INJECTION, SOLUTION INTRAVENOUS; SUBCUTANEOUS at 09:37

## 2018-11-23 RX ADMIN — INSULIN LISPRO SCH UNITS: 100 INJECTION, SOLUTION INTRAVENOUS; SUBCUTANEOUS at 09:36

## 2018-11-23 RX ADMIN — MORPHINE SULFATE PRN MG: 2 INJECTION, SOLUTION INTRAMUSCULAR; INTRAVENOUS at 09:58

## 2018-11-23 RX ADMIN — Medication PRN EACH: at 14:37

## 2018-11-23 RX ADMIN — INSULIN LISPRO SCH UNITS: 100 INJECTION, SOLUTION INTRAVENOUS; SUBCUTANEOUS at 12:22

## 2018-11-23 RX ADMIN — PANTOPRAZOLE SODIUM SCH MG: 40 TABLET, DELAYED RELEASE ORAL at 09:29

## 2018-11-23 RX ADMIN — LISINOPRIL SCH MG: 10 TABLET ORAL at 09:29

## 2018-11-23 RX ADMIN — INSULIN LISPRO SCH UNITS: 100 INJECTION, SOLUTION INTRAVENOUS; SUBCUTANEOUS at 12:20

## 2018-11-23 RX ADMIN — BACITRACIN SCH MLS/HR: 5000 INJECTION, POWDER, FOR SOLUTION INTRAMUSCULAR at 05:07

## 2018-11-23 NOTE — PDOC3
Discharge Summary [facility]


Date of Admission:  Nov 20, 2018


Discharge Date:  Nov 23, 2018


Admitting Diagnosis


 intractable N/V and abd pain with hemoptesis,  and hematochezia, not clear 

etiology


Acute blood loss with stable hb


Migraines


DM - with A1c of 12.6


FV leiden


subtherapeutic INR on warfarin


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


morbid obesity


CONSULTS


gi


Brief Hospital Course


 Patient is a 43  year old F who presents with GI bleeding, N/V ,abd pain x3ds.





pt was dced by me 1 week ago for similar symptoms.


She said since dc, abd pain was better 2/10. till Sunday, She started to have 

severe diffuse abd pain, dull, 8/10, no radiation. also has multiple times N/V 

, with bright red blood. also has loose BM with some bright red blood clots. Hb 

stable in ER 14, and 13 today.


no fever, chills, cough, sob. 


has been taking warfarin for h/o dvt, PE, last time 2ys ago, factor 5 leiden. 

INR IN ER 1. 


abd ct in ER wo contrast non remarkable. CT Angiogram neg.


plan to DO GES, but cannot do till Monday due to charles. pt has no vomiting 

in Hosp, some nausea, eats ok, no BM in hosp. 


she doesnot wanna wait till Monday. wants go home today.


advance diet and if ok can go home later. cont lovenox bid till inr 2-3, with 

warfarin.


dc time 35min. tramadol prescribed. 





General:  Alert, Oriented X3, Cooperative


Heart:  Regular rate, Normal S1, Normal S2


Lungs:  Clear


Abdomen:  Normal bowel sounds, Soft, Other (diffuse middle abd mild tenderness)


Extremities:  No clubbing, No cyanosis


Skin:  No rashes


Disposition


home


CONDITION AT DISCHARGE:  Improved


Scheduled


Enoxaparin Sodium (Enoxaparin Sodium), 110 MG SQ Q12HR


Insulin Glargine,Hum.rec.anlog (Lantus Solostar), 66 UNIT SQ QHS, (Reported)


Polyethylene Glycol 3350 (Polyethylene Glycol 3350), 17 GM PO BID


Quinapril/Hydrochlorothiazide (Quinapril-Hctz 10-12.5 Mg Tab), 1 EACH PO DAILY, 

(Reported)


Rosuvastatin Calcium (Crestor), 1 TAB PO DAILY, (Reported)


[Pantoprazole], 40 MG PO DAILYAC





Scheduled PRN


Clonazepam (Clonazepam), 1 TAB PO PRN DAILY PRN for ANXIETY / AGITATION, (

Reported)


Dicyclomine Hcl (Dicyclomine Hcl), 10 MG PO QID PRN for abdominal pain


Tramadol Hcl (Tramadol Hcl), 50 MG PO PRN Q6HRS PRN for MILD TO MODERATE PAIN





Discontinued Medications


Candesartan Cilexetil (Atacand), 1 TAB PO DAILY, (Reported)


Eletriptan Hbr (Relpax), 20 MG PO, (Reported)


Enoxaparin Sodium (Enoxaparin Sodium), 100 MG SQ Q12HR


Metoclopramide Hcl (Reglan), 10 MG PO TIDWMEALHC, (Reported)


Pregabalin (Lyrica), 50 MG PO BID, (Reported)


Sertraline Hcl (Zoloft), 1 TAB PO DAILY, (Reported)











TERESA LESLIE MD Nov 23, 2018 14:15

## 2018-12-14 ENCOUNTER — HOSPITAL ENCOUNTER (OUTPATIENT)
Dept: HOSPITAL 61 - SURG | Age: 43
Discharge: HOME | End: 2018-12-14
Attending: INTERNAL MEDICINE
Payer: COMMERCIAL

## 2018-12-14 VITALS — SYSTOLIC BLOOD PRESSURE: 133 MMHG | DIASTOLIC BLOOD PRESSURE: 85 MMHG

## 2018-12-14 DIAGNOSIS — E78.5: ICD-10-CM

## 2018-12-14 DIAGNOSIS — Z79.899: ICD-10-CM

## 2018-12-14 DIAGNOSIS — Z90.49: ICD-10-CM

## 2018-12-14 DIAGNOSIS — E11.9: ICD-10-CM

## 2018-12-14 DIAGNOSIS — D68.2: ICD-10-CM

## 2018-12-14 DIAGNOSIS — Z98.51: ICD-10-CM

## 2018-12-14 DIAGNOSIS — Z79.4: ICD-10-CM

## 2018-12-14 DIAGNOSIS — I10: ICD-10-CM

## 2018-12-14 DIAGNOSIS — Z88.1: ICD-10-CM

## 2018-12-14 DIAGNOSIS — Z88.0: ICD-10-CM

## 2018-12-14 DIAGNOSIS — Z91.018: ICD-10-CM

## 2018-12-14 DIAGNOSIS — K64.0: Primary | ICD-10-CM

## 2018-12-14 DIAGNOSIS — Z79.84: ICD-10-CM

## 2018-12-14 DIAGNOSIS — Z88.8: ICD-10-CM

## 2018-12-14 LAB — U PREG PATIENT: NEGATIVE

## 2018-12-14 PROCEDURE — 45380 COLONOSCOPY AND BIOPSY: CPT

## 2018-12-14 PROCEDURE — 88305 TISSUE EXAM BY PATHOLOGIST: CPT

## 2018-12-14 PROCEDURE — 81025 URINE PREGNANCY TEST: CPT

## 2018-12-17 NOTE — PATHOLOGY
[facility] Accession Number: [id number]

.                                                                01

Material submitted:                                        .

RANDOM COLON BIOPSY

.                                                                01

Clinical history:                                          .

Abdominal pain, diarrhea, Hx colitis

.                                                                02

**********************************************************************

Diagnosis:

Colonic mucosa, random colon biopsy:

- No significant pathologic abnormalities.

(JPM:kat; 12/17/2018)

QMS/12/17/2018

**********************************************************************

.                                                                02

Comment:

Sections of the random colon biopsy reveal multiple segments of colonic

mucosa containing a few mucosa-associated lymphoid aggregates.  There is

no evidence of a chronic destructive colitis, lymphocytic colitis, or

collagenous colitis.  There is no dysplasia or evidence of malignancy.

(JPM:kat; 12/17/2018)

.                                                                02

Electronically signed:                                     .

Vincent Harris MD, Pathologist

NPI- [id number]

.                                                                01

Gross description:                                         .

Received in formalin labeled "Saray Reveles, random colon BX," are

multiple segments of tan soft tissue measuring 1.8 x 0.6 x 0.2 cm in

aggregate dimensions.  The specimen is filtered and entirely submitted in

cassette A1.

(TSD; 12/14/2018)

TOB/TOB

.                                                                02

Pathologist provided ICD-10:

R10.9, R19.7, Z87.19

.                                                                02

CPT                                                        .

077095

Specimen Comment: A courtesy copy of this report has been sent to

Specimen Comment: [phone].

Specimen Comment: Report sent to 

***Performed at:  01

   [facility]

   [address], [place]  [id number]

   MD Pablo Ordonez MD Phone:  [phone]

***Performed at:  02

   [facility]

   [address] [place], [place]  [id number]

   MD Vincent Harris MD Phone:  [phone]

## 2019-04-03 ENCOUNTER — HOSPITAL ENCOUNTER (EMERGENCY)
Dept: HOSPITAL 35 - ER | Age: 44
Discharge: HOME | End: 2019-04-03
Payer: COMMERCIAL

## 2019-04-03 VITALS — SYSTOLIC BLOOD PRESSURE: 177 MMHG | DIASTOLIC BLOOD PRESSURE: 97 MMHG

## 2019-04-03 VITALS — HEIGHT: 67 IN | BODY MASS INDEX: 36.89 KG/M2 | WEIGHT: 235.01 LBS

## 2019-04-03 DIAGNOSIS — E11.65: Primary | ICD-10-CM

## 2019-04-03 DIAGNOSIS — Z88.8: ICD-10-CM

## 2019-04-03 DIAGNOSIS — Z88.0: ICD-10-CM

## 2019-04-03 DIAGNOSIS — Z88.6: ICD-10-CM

## 2019-04-03 DIAGNOSIS — Z79.4: ICD-10-CM

## 2019-04-03 LAB
ANION GAP SERPL CALC-SCNC: 13 MMOL/L (ref 7–16)
BASOPHILS NFR BLD AUTO: 1 % (ref 0–2)
BUN SERPL-MCNC: 13 MG/DL (ref 7–18)
CALCIUM SERPL-MCNC: 9.8 MG/DL (ref 8.5–10.1)
CHLORIDE SERPL-SCNC: 94 MMOL/L (ref 98–107)
CO2 SERPL-SCNC: 23 MMOL/L (ref 21–32)
CREAT SERPL-MCNC: 0.8 MG/DL (ref 0.6–1)
EOSINOPHIL NFR BLD: 1.3 % (ref 0–3)
ERYTHROCYTE [DISTWIDTH] IN BLOOD BY AUTOMATED COUNT: 13.2 % (ref 10.5–14.5)
GLUCOSE SERPL-MCNC: 507 MG/DL (ref 74–106)
GRANULOCYTES NFR BLD MANUAL: 55.9 % (ref 36–66)
HCT VFR BLD CALC: 44.1 % (ref 37–47)
HGB BLD-MCNC: 15.5 GM/DL (ref 12–15)
INR PPP: 1
LYMPHOCYTES NFR BLD AUTO: 34.3 % (ref 24–44)
MCH RBC QN AUTO: 29.3 PG (ref 26–34)
MCHC RBC AUTO-ENTMCNC: 35.1 G/DL (ref 28–37)
MCV RBC: 83.5 FL (ref 80–100)
MONOCYTES NFR BLD: 7.5 % (ref 1–8)
NEUTROPHILS # BLD: 3.6 THOU/UL (ref 1.4–8.2)
PLATELET # BLD: 241 THOU/UL (ref 150–400)
POTASSIUM SERPL-SCNC: 3.8 MMOL/L (ref 3.5–5.1)
PROTHROMBIN TIME: 9.8 SECONDS (ref 9.3–11.4)
RBC # BLD AUTO: 5.28 MIL/UL (ref 4.2–5)
SODIUM SERPL-SCNC: 130 MMOL/L (ref 136–145)
WBC # BLD AUTO: 6.4 THOU/UL (ref 4–11)

## 2019-04-23 ENCOUNTER — HOSPITAL ENCOUNTER (EMERGENCY)
Dept: HOSPITAL 35 - ER | Age: 44
LOS: 1 days | Discharge: HOME | End: 2019-04-24
Payer: COMMERCIAL

## 2019-04-23 VITALS — BODY MASS INDEX: 36.1 KG/M2 | HEIGHT: 67 IN | WEIGHT: 230.01 LBS

## 2019-04-23 DIAGNOSIS — Z88.0: ICD-10-CM

## 2019-04-23 DIAGNOSIS — Z79.4: ICD-10-CM

## 2019-04-23 DIAGNOSIS — R07.89: Primary | ICD-10-CM

## 2019-04-23 DIAGNOSIS — E11.9: ICD-10-CM

## 2019-04-23 DIAGNOSIS — Z88.6: ICD-10-CM

## 2019-04-23 DIAGNOSIS — R79.1: ICD-10-CM

## 2019-04-23 DIAGNOSIS — Z88.8: ICD-10-CM

## 2019-04-23 DIAGNOSIS — I10: ICD-10-CM

## 2019-04-23 LAB
ALBUMIN SERPL-MCNC: 3.3 G/DL (ref 3.4–5)
ALT SERPL-CCNC: 64 U/L (ref 30–65)
ANION GAP SERPL CALC-SCNC: 11 MMOL/L (ref 7–16)
AST SERPL-CCNC: 24 U/L (ref 15–37)
BASOPHILS NFR BLD AUTO: 0.7 % (ref 0–2)
BILIRUB SERPL-MCNC: 0.2 MG/DL
BUN SERPL-MCNC: 14 MG/DL (ref 7–18)
CALCIUM SERPL-MCNC: 8.9 MG/DL (ref 8.5–10.1)
CHLORIDE SERPL-SCNC: 95 MMOL/L (ref 98–107)
CO2 SERPL-SCNC: 24 MMOL/L (ref 21–32)
CREAT SERPL-MCNC: 0.9 MG/DL (ref 0.6–1)
EOSINOPHIL NFR BLD: 1.3 % (ref 0–3)
ERYTHROCYTE [DISTWIDTH] IN BLOOD BY AUTOMATED COUNT: 13.6 % (ref 10.5–14.5)
GLUCOSE SERPL-MCNC: 517 MG/DL (ref 74–106)
GRANULOCYTES NFR BLD MANUAL: 55.8 % (ref 36–66)
HCT VFR BLD CALC: 40.5 % (ref 37–47)
HGB BLD-MCNC: 14.2 GM/DL (ref 12–15)
INR PPP: 1
LYMPHOCYTES NFR BLD AUTO: 35.2 % (ref 24–44)
MCH RBC QN AUTO: 29.3 PG (ref 26–34)
MCHC RBC AUTO-ENTMCNC: 35 G/DL (ref 28–37)
MCV RBC: 83.8 FL (ref 80–100)
MONOCYTES NFR BLD: 7 % (ref 1–8)
NEUTROPHILS # BLD: 3.2 THOU/UL (ref 1.4–8.2)
PLATELET # BLD: 213 THOU/UL (ref 150–400)
POTASSIUM SERPL-SCNC: 4.1 MMOL/L (ref 3.5–5.1)
PROT SERPL-MCNC: 7.4 G/DL (ref 6.4–8.2)
PROTHROMBIN TIME: 9.9 SECONDS (ref 9.3–11.4)
RBC # BLD AUTO: 4.83 MIL/UL (ref 4.2–5)
SODIUM SERPL-SCNC: 130 MMOL/L (ref 136–145)
TROPONIN I SERPL-MCNC: <0.06 NG/ML (ref ?–0.06)
WBC # BLD AUTO: 5.7 THOU/UL (ref 4–11)

## 2019-04-24 VITALS — DIASTOLIC BLOOD PRESSURE: 90 MMHG | SYSTOLIC BLOOD PRESSURE: 137 MMHG

## 2019-05-23 NOTE — CONS
Call to patient. She states she hasn't urinated since yesterday. She does not have any bladder fullness, pain or urge to go.  She is requesting an antibiotic. She states \"I have had this before and I don't want to do anything extreme.\"  Given her MS dx and not having symptoms advised she go to ED.     Reason for Disposition  • [1] Unable to urinate (or only a few drops) > 4 hours AND     [2] bladder feels very full (e.g., palpable bladder or strong urge to urinate)     Patient has MS.    Protocols used: URINARY SYMPTOMS-A-AH       DATE OF CONSULTATION:  12/14/2018



REASON FOR CONSULTATION:  Left lower quadrant abdominal pain, abnormal CAT scan,

possible colitis.



HISTORY OF PRESENT ILLNESS:  A 43-year-old  female whose past medical

history is significant for diabetes, hyperlipidemia, status post cholecystectomy

and tubal ligation is seen for colonoscopy.  Recent stay with abnormal CAT scan

revealed possible colitis.  She is also known to have factor V deficiency, and

she is here today for surveillance study.



PAST MEDICAL HISTORY:  Factor V deficiency, diabetes, hypertension,

hyperlipidemia, status post cholecystectomy and tubal ligation.



ALLERGIES:  PENICILLIN, PROPOXYPHENE AND VANCOMYCIN.



MEDICATIONS:  Include insulin, metformin, polyethylene glycol, quinapril,

Crestor, tramadol, warfarin and Protonix.



FAMILY AND SOCIAL HISTORY:  Nonsmoker, nondrinker.



REVIEW OF SYSTEMS:  Per records.



PHYSICAL EXAMINATION:

GENERAL:  Reveals a well-nourished, well-developed female who is alert,

cooperative, is in mild distress.

VITAL SIGNS:  Temperature is 97.9, pulse 94, respirations 20.

HEENT:  Reveals normocephalic, atraumatic head.  Pupils and extraocular muscles

are not tested.  Sclerae anicteric.

NECK:  Supple.

LUNGS:  Clear.

CARDIOVASCULAR:  Reveals an S1, S2 without S3, S4 or appreciable murmur.

ABDOMEN:  Soft abdomen, normal bowel sounds without appreciable

hepatosplenomegaly.

EXTREMITIES:  Reveals no cyanosis, clubbing or edema.



IMPRESSION:  Abnormal CAT scan, left lower quadrant abdominal pain, possible

colitis.  Differential includes diverticular disease, new onset of inflammatory

bowel disease and colon cancer.  Surveillance colonoscopy is recommended.

 



______________________________

FLORINDA BHAGAT MD



DR:  SSP/chau  JOB#:  [id number] / [id number]

DD:  12/14/2018 09:29  DT:  12/14/2018 10:52

## 2024-09-30 NOTE — PDOC
One week follow up call for New Patient appointment with Diana Hidalgo on 10/14/24  was made on 9/30/24. Writer informed patient of New Patient paperwork needing to be completed 5 days prior to the appointment. Writer confirmed paperwork has been sent via [employer].    Appointment was made on: 9/23/24       Subjective:


Subjective:


Abd pain persists.  Touching it makes it worse.  Hasn't stooled in a couple 

days.





Objective:


Vital Signs:





 Vital Signs








  Date Time  Temp Pulse Resp B/P (MAP) Pulse Ox O2 Delivery O2 Flow Rate FiO2


 


11/7/18 12:12   16   Room Air  


 


11/7/18 11:00 97.7 75  136/88 (104) 90   





 97.7       








Labs:





Laboratory Tests








Test


 11/6/18


16:25 11/6/18


21:19 11/7/18


03:20 11/7/18


07:29


 


Glucose (Fingerstick) 197 mg/dL  203 mg/dL   253 mg/dL 


 


White Blood Count   5.4 x10^3/uL  


 


Red Blood Count   4.56 x10^6/uL  


 


Hemoglobin   13.3 g/dL  


 


Hematocrit   38.9 %  


 


Mean Corpuscular Volume   86 fL  


 


Mean Corpuscular Hemoglobin   29 pg  


 


Mean Corpuscular Hemoglobin


Concent 


 


 34 g/dL 


 





 


Red Cell Distribution Width   13.9 %  


 


Platelet Count   200 x10^3/uL  


 


Neutrophils (%) (Auto)   68 %  


 


Lymphocytes (%) (Auto)   22 %  


 


Monocytes (%) (Auto)   7 %  


 


Eosinophils (%) (Auto)   2 %  


 


Basophils (%) (Auto)   1 %  


 


Neutrophils # (Auto)   3.7 x10^3uL  


 


Lymphocytes # (Auto)   1.2 x10^3/uL  


 


Monocytes # (Auto)   0.4 x10^3/uL  


 


Eosinophils # (Auto)   0.1 x10^3/uL  


 


Basophils # (Auto)   0.0 x10^3/uL  


 


Prothrombin Time   47.6 SEC  


 


Prothromb Time International


Ratio 


 


 5.3 


 





 


Sodium Level   140 mmol/L  


 


Potassium Level   4.0 mmol/L  


 


Chloride Level   103 mmol/L  


 


Carbon Dioxide Level   29 mmol/L  


 


Anion Gap   8  


 


Blood Urea Nitrogen   11 mg/dL  


 


Creatinine   0.6 mg/dL  


 


Estimated GFR


(Cockcroft-Gault) 


 


 109.6 


 





 


Glucose Level   237 mg/dL  


 


Calcium Level   9.1 mg/dL  


 


Test


 11/7/18


11:23 


 


 





 


Glucose (Fingerstick) 312 mg/dL    











PE:





GEN: NAD, watching a show on her phone, lights off


LUNGS: CTAB


HEART: RRR


ABD: NABS, soft, obese, tender across abdomen below umbilicus - nonspecific, 

bruising noted


NEURO/PSYCH: A & O 3





A/P:


Factor V Leiden, Coumadin coagulopathy - INR worse


DM


Abd pain


Abnormal CT - "segmental wall thickening of the descending colon and sigmoid 

colon... may be due to incomplete distention"





--


Plans to pursue outpt colonoscopy.


Still on IV atbx.


Hasn't tried Bentyl.  Seems morphine dose reduced today, also has Roxicodone 

and Tramadol.


Has some things ordered for constipation PRN.











CORY GARNER Nov 7, 2018 14:50